# Patient Record
Sex: FEMALE | Race: WHITE | NOT HISPANIC OR LATINO | Employment: FULL TIME | URBAN - METROPOLITAN AREA
[De-identification: names, ages, dates, MRNs, and addresses within clinical notes are randomized per-mention and may not be internally consistent; named-entity substitution may affect disease eponyms.]

---

## 2017-02-02 ENCOUNTER — OFFICE VISIT (OUTPATIENT)
Dept: INTERNAL MEDICINE | Facility: CLINIC | Age: 42
End: 2017-02-02

## 2017-02-02 VITALS
HEIGHT: 70 IN | RESPIRATION RATE: 18 BRPM | WEIGHT: 180 LBS | DIASTOLIC BLOOD PRESSURE: 78 MMHG | SYSTOLIC BLOOD PRESSURE: 102 MMHG | HEART RATE: 72 BPM | BODY MASS INDEX: 25.77 KG/M2 | OXYGEN SATURATION: 99 %

## 2017-02-02 DIAGNOSIS — F41.0 PANIC ATTACK: ICD-10-CM

## 2017-02-02 DIAGNOSIS — F41.9 ANXIETY: Primary | ICD-10-CM

## 2017-02-02 PROBLEM — G43.909 MIGRAINE: Status: ACTIVE | Noted: 2017-02-02

## 2017-02-02 PROBLEM — E55.9 VITAMIN D DEFICIENCY: Status: ACTIVE | Noted: 2017-02-02

## 2017-02-02 PROCEDURE — 99213 OFFICE O/P EST LOW 20 MIN: CPT | Performed by: INTERNAL MEDICINE

## 2017-02-02 RX ORDER — DULOXETIN HYDROCHLORIDE 60 MG/1
60 CAPSULE, DELAYED RELEASE ORAL DAILY
Qty: 30 CAPSULE | Refills: 6 | Status: SHIPPED | OUTPATIENT
Start: 2017-02-02 | End: 2017-03-20

## 2017-02-02 RX ORDER — NARATRIPTAN 2.5 MG/1
TABLET ORAL
Refills: 2 | COMMUNITY
Start: 2017-01-24 | End: 2017-10-23

## 2017-02-02 RX ORDER — ALPRAZOLAM 0.25 MG/1
0.25 TABLET ORAL NIGHTLY PRN
Qty: 6 TABLET | Refills: 1 | Status: SHIPPED | OUTPATIENT
Start: 2017-02-02 | End: 2017-06-26 | Stop reason: SDUPTHER

## 2017-02-02 RX ORDER — ALPRAZOLAM 0.25 MG/1
TABLET ORAL
COMMUNITY
Start: 2015-10-01 | End: 2017-02-02 | Stop reason: SDUPTHER

## 2017-02-02 RX ORDER — DEXAMETHASONE 4 MG/1
TABLET ORAL
Refills: 1 | COMMUNITY
Start: 2016-12-13 | End: 2017-10-23

## 2017-02-02 NOTE — PROGRESS NOTES
Chief Complaint  Chhaya Wyatt is a 42 y.o. female who presents for Panic Attack (Pt feels like panic attacks are worsening. )  .    History of Present Illness   She has had a lot of nauseousness that she associates with anxiety.  She has had two panic attacks this week that she had to take her xanax for.  She is still taking lexapro.  She is also on propranolol daily for her migraines.  She always has some stressor in her life but nothing new or overwhelming right now.  She isn't exercising much.  She is wondering if the lexapro just isn't helping anymore.        Review of Systems   Psychiatric/Behavioral: Positive for dysphoric mood and sleep disturbance. Negative for agitation and self-injury. The patient is nervous/anxious.        Patient Active Problem List   Diagnosis   • Anxiety   • Migraine   • Migraine without aura and responsive to treatment   • Panic attack   • Vitamin D deficiency       No past medical history on file.    No past surgical history on file.    No family history on file.    Social History     Social History   • Marital status:      Spouse name: N/A   • Number of children: N/A   • Years of education: N/A     Occupational History   • Not on file.     Social History Main Topics   • Smoking status: Not on file   • Smokeless tobacco: Not on file   • Alcohol use Not on file   • Drug use: Not on file   • Sexual activity: Not on file     Other Topics Concern   • Not on file     Social History Narrative       Current Outpatient Prescriptions on File Prior to Visit   Medication Sig Dispense Refill   • propranolol (INDERAL) 20 MG tablet TAKE 1 TABLET TWICE DAILY. 180 tablet 0   • [DISCONTINUED] escitalopram (LEXAPRO) 20 MG tablet TAKE 1 TABLET EVERY DAY 90 tablet 2     No current facility-administered medications on file prior to visit.        Allergies   Allergen Reactions   • Bacitracin        Vitals:    02/02/17 1133   BP: 102/78   Pulse: 72   Resp: 18   SpO2: 99%   Weight: 180 lb (81.6 kg)  "  Height: 70\" (177.8 cm)       Body mass index is 25.83 kg/(m^2).    Objective   Physical Exam   Constitutional: She is oriented to person, place, and time. She appears well-developed and well-nourished. No distress.   HENT:   Head: Normocephalic and atraumatic.   Eyes: Conjunctivae are normal. No scleral icterus.   Neck: Normal range of motion. Neck supple.   Cardiovascular: Normal rate and regular rhythm.    Pulmonary/Chest: Effort normal and breath sounds normal.   Lymphadenopathy:     She has no cervical adenopathy.   Neurological: She is alert and oriented to person, place, and time. No cranial nerve deficit.   Psychiatric: She has a normal mood and affect. Her behavior is normal. Judgment and thought content normal.       Results for orders placed or performed in visit on 08/18/15   CBC and Differential   Result Value Ref Range    WBC 5.15 4.50 - 10.70 K/Cumm    RBC 5.02 3.90 - 5.20 Million    Hemoglobin 13.4 11.9 - 15.5 g/dL    Hematocrit 41.3 35.6 - 45.5 %    MCV 82.3 80.5 - 98.2 fL    MCH 26.7 (L) 26.9 - 32.0 pg    MCHC 32.4 32.4 - 36.3 g/dL    RDW 12.8 11.7 - 13.0 %    Platelets 216 140 - 500 K/Cumm    Neutrophil Rel % 63.7 42.7 - 76.0 %    Lymphocyte Rel % 23.9 19.6 - 45.3 %    Monocyte Rel % 9.9 5.0 - 12.0 %    Eosinophil Rel % 2.3 0.3 - 6.2 %    Basophil Rel % 0.2 0.0 - 1.5 %    Neutrophils Absolute 3.3 1.9 - 8.1 K/Cumm    Lymphocytes Absolute 1.2 0.9 - 4.8 K/Cumm    Monocytes Absolute 0.5 0.2 - 1.2 K/Cumm    Eosinophils Absolute 0.1 0.0 - 0.7 K/Cumm    Basophils Absolute 0.0 0.0 - 0.2 K/Cumm    Immature Granulocyte Rel % 0.0 0.0 - 0.6 %    Immature Grans Absolute 0.0 0.00 - 0.64 K/Cumm   Comprehensive metabolic panel   Result Value Ref Range    Glucose 82 65 - 99 mg/dL    BUN 12 6 - 20 mg/dL    Creatinine 0.99 0.57 - 1.00 mg/dL    eGFR Non African Am >60 mL/min/1.732    eGFR African Am >60 mL/min/1.732    BUN/Creatinine Ratio 12     Sodium 140 136 - 145 mmol/L    Potassium 3.9 3.5 - 5.2 mmol/L    " Chloride 100 98 - 107 mmol/L    Total CO2 25 22 - 29 mmol/L    Calcium 9.3 8.6 - 10.5 mg/dL    Total Protein 7.2 6.0 - 8.5 g/dL    Albumin 4.7 3.5 - 5.2 g/dL    Globulin 2.5 g/dL    A/G Ratio 1.9     Total Bilirubin 0.4 0.1 - 1.2 mg/dL    Alkaline Phosphatase 53 39 - 117 U/L    AST (SGOT) 16 5 - 32 U/L    ALT (SGPT) 10 5 - 33 U/L   Amylase   Result Value Ref Range    Amylase 81 28 - 100 U/L   Lipase   Result Value Ref Range    Lipase 34 13 - 60 U/L       Assessment/Plan   Diagnoses and all orders for this visit:    Anxiety    Panic attack    Other orders  -     dexamethasone (DECADRON) 4 MG tablet; TAKE 2-3 TABLETS BY MOUTH ONCE AS NEEDED (HEADACHE)  -     naratriptan (AMERGE) 2.5 MG tablet; TAKE 1 TABLET BY MOUTH AT ONSET OF MIGRAINE, MAY REPEAT IF MIGRAINE PERSISTS. MAX 5MG/24HR  -     Discontinue: ALPRAZolam (XANAX) 0.25 MG tablet; Take  by mouth.  -     ALPRAZolam (XANAX) 0.25 MG tablet; Take 1 tablet by mouth At Night As Needed for anxiety.  -     DULoxetine (CYMBALTA) 60 MG capsule; Take 1 capsule by mouth Daily.      Discussion/Summary  Chhaya is here fore acute care for her anxiety.  She has had worsening symptoms lately accompanied by several panic attacks.  We are going to try changing her lexapro to cymbalta.  I have strongly encouraged her to exercise.  She has a physical scheduled in July.  Certainly, we can follow up sooner if her symptoms aren't improving.  New agreement signed today for the xanax.  Refill given.    No Follow-up on file.

## 2017-03-17 ENCOUNTER — TELEPHONE (OUTPATIENT)
Dept: INTERNAL MEDICINE | Facility: CLINIC | Age: 42
End: 2017-03-17

## 2017-03-17 NOTE — TELEPHONE ENCOUNTER
Pt stated that we switched her from her lexapro to cymbalta, 6 weeks ago and that she is having dizzy spells and trouble thinking. Wants to know what to do for this.      Ward advised to see pt Monday.   Pt scheduled.

## 2017-03-20 ENCOUNTER — OFFICE VISIT (OUTPATIENT)
Dept: INTERNAL MEDICINE | Facility: CLINIC | Age: 42
End: 2017-03-20

## 2017-03-20 VITALS
DIASTOLIC BLOOD PRESSURE: 82 MMHG | OXYGEN SATURATION: 98 % | WEIGHT: 182 LBS | BODY MASS INDEX: 26.05 KG/M2 | HEART RATE: 76 BPM | SYSTOLIC BLOOD PRESSURE: 124 MMHG | HEIGHT: 70 IN | RESPIRATION RATE: 18 BRPM

## 2017-03-20 DIAGNOSIS — R42 DIZZINESS: ICD-10-CM

## 2017-03-20 DIAGNOSIS — F41.9 ANXIETY: Primary | ICD-10-CM

## 2017-03-20 PROCEDURE — 99214 OFFICE O/P EST MOD 30 MIN: CPT | Performed by: INTERNAL MEDICINE

## 2017-03-20 RX ORDER — FLUOXETINE 20 MG/1
20 TABLET, FILM COATED ORAL DAILY
Qty: 30 TABLET | Refills: 6 | Status: SHIPPED | OUTPATIENT
Start: 2017-03-20 | End: 2017-06-18

## 2017-03-20 RX ORDER — FLUOXETINE 20 MG/1
20 TABLET, FILM COATED ORAL DAILY
Qty: 30 TABLET | Refills: 2 | Status: SHIPPED | OUTPATIENT
Start: 2017-03-20 | End: 2017-03-20 | Stop reason: SDUPTHER

## 2017-03-20 NOTE — PROGRESS NOTES
Chief Complaint  Chhaya Wyatt is a 42 y.o. female who presents for Medication Reaction (Pt was switched to cymbalta from lexapro and is not tolerating the Cymbalta very well. Having dizzy spells and cannot form clear thoughts sometimes. )  .    History of Present Illness   Chhaya is here for follow up for her anxiety.  She has been on cymbalta now for six weeks.  She has felt dizzy and just not all there cognitively. She can't really tell if the anxiety is any different because these new symptoms/side effects have really thrown her for a loop.  She would like to go back to an SSRI.  She was on lexapro before the cymbalta.  She has been on fluoxetine many years ago.      Review of Systems   Constitutional: Positive for fatigue.   Neurological: Positive for dizziness. Negative for light-headedness and headaches.   Psychiatric/Behavioral: Positive for decreased concentration. Negative for agitation and confusion. The patient is nervous/anxious.        Patient Active Problem List   Diagnosis   • Anxiety   • Migraine   • Migraine without aura and responsive to treatment   • Panic attack   • Vitamin D deficiency       No past medical history on file.    No past surgical history on file.    No family history on file.    Social History     Social History   • Marital status:      Spouse name: N/A   • Number of children: N/A   • Years of education: N/A     Occupational History   • Not on file.     Social History Main Topics   • Smoking status: Not on file   • Smokeless tobacco: Not on file   • Alcohol use Not on file   • Drug use: Not on file   • Sexual activity: Not on file     Other Topics Concern   • Not on file     Social History Narrative       Current Outpatient Prescriptions on File Prior to Visit   Medication Sig Dispense Refill   • ALPRAZolam (XANAX) 0.25 MG tablet Take 1 tablet by mouth At Night As Needed for anxiety. 6 tablet 1   • dexamethasone (DECADRON) 4 MG tablet TAKE 2-3 TABLETS BY MOUTH ONCE AS NEEDED  "(HEADACHE)  1   • naratriptan (AMERGE) 2.5 MG tablet TAKE 1 TABLET BY MOUTH AT ONSET OF MIGRAINE, MAY REPEAT IF MIGRAINE PERSISTS. MAX 5MG/24HR  2   • propranolol (INDERAL) 20 MG tablet TAKE 1 TABLET TWICE DAILY. 180 tablet 0   • [DISCONTINUED] DULoxetine (CYMBALTA) 60 MG capsule Take 1 capsule by mouth Daily. 30 capsule 6     No current facility-administered medications on file prior to visit.        Allergies   Allergen Reactions   • Bacitracin        Vitals:    03/20/17 1144   BP: 124/82   Pulse: 76   Resp: 18   SpO2: 98%   Weight: 182 lb (82.6 kg)   Height: 70\" (177.8 cm)       Body mass index is 26.11 kg/(m^2).    Objective   Physical Exam   Constitutional: She is oriented to person, place, and time. She appears well-developed and well-nourished. No distress.   HENT:   Head: Normocephalic and atraumatic.   Eyes: Conjunctivae are normal. No scleral icterus.   Pulmonary/Chest: Effort normal. No respiratory distress.   Neurological: She is alert and oriented to person, place, and time. No cranial nerve deficit.   Psychiatric: She has a normal mood and affect. Her behavior is normal. Judgment and thought content normal. Her speech is not rapid and/or pressured and not slurred. Cognition and memory are normal.         Assessment/Plan   Diagnoses and all orders for this visit:    Anxiety  -     Discontinue: FLUoxetine (PROzac) 20 MG tablet; Take 1 tablet by mouth Daily for 90 days.  -     FLUoxetine (PROzac) 20 MG tablet; Take 1 tablet by mouth Daily for 90 days.    Dizziness  -     Hemoglobin A1c  -     Comprehensive Metabolic Panel  -     CBC & Differential        Discussion/Summary  Chhaya is here to follow up on her anxiety.  She is not tolerating the cymbalta well.  She is having a lot of dizziness and mental fogginess.  We are going to put her back on an SSRI but try fluoxetine instead of lexapro.  Will check basic labs today since it has been two years since she had any labs drawn.    No Follow-up on " file.

## 2017-03-21 ENCOUNTER — TELEPHONE (OUTPATIENT)
Dept: INTERNAL MEDICINE | Facility: CLINIC | Age: 42
End: 2017-03-21

## 2017-03-21 LAB
ALBUMIN SERPL-MCNC: 4.8 G/DL (ref 3.5–5.2)
ALBUMIN/GLOB SERPL: 1.8 G/DL
ALP SERPL-CCNC: 60 U/L (ref 39–117)
ALT SERPL-CCNC: 33 U/L (ref 1–33)
AST SERPL-CCNC: 28 U/L (ref 1–32)
BASOPHILS # BLD AUTO: 0.02 10*3/MM3 (ref 0–0.2)
BASOPHILS NFR BLD AUTO: 0.3 % (ref 0–1.5)
BILIRUB SERPL-MCNC: 0.3 MG/DL (ref 0.1–1.2)
BUN SERPL-MCNC: 15 MG/DL (ref 6–20)
BUN/CREAT SERPL: 18.3 (ref 7–25)
CALCIUM SERPL-MCNC: 9.3 MG/DL (ref 8.6–10.5)
CHLORIDE SERPL-SCNC: 102 MMOL/L (ref 98–107)
CO2 SERPL-SCNC: 24.5 MMOL/L (ref 22–29)
CREAT SERPL-MCNC: 0.82 MG/DL (ref 0.57–1)
EOSINOPHIL # BLD AUTO: 0.15 10*3/MM3 (ref 0–0.7)
EOSINOPHIL NFR BLD AUTO: 2.6 % (ref 0.3–6.2)
ERYTHROCYTE [DISTWIDTH] IN BLOOD BY AUTOMATED COUNT: 13.5 % (ref 11.7–13)
GLOBULIN SER CALC-MCNC: 2.6 GM/DL
GLUCOSE SERPL-MCNC: 77 MG/DL (ref 65–99)
HBA1C MFR BLD: 5.23 % (ref 4.8–5.6)
HCT VFR BLD AUTO: 40.2 % (ref 35.6–45.5)
HGB BLD-MCNC: 12.7 G/DL (ref 11.9–15.5)
IMM GRANULOCYTES # BLD: 0 10*3/MM3 (ref 0–0.03)
IMM GRANULOCYTES NFR BLD: 0 % (ref 0–0.5)
LYMPHOCYTES # BLD AUTO: 1.62 10*3/MM3 (ref 0.9–4.8)
LYMPHOCYTES NFR BLD AUTO: 27.9 % (ref 19.6–45.3)
MCH RBC QN AUTO: 27.1 PG (ref 26.9–32)
MCHC RBC AUTO-ENTMCNC: 31.6 G/DL (ref 32.4–36.3)
MCV RBC AUTO: 85.7 FL (ref 80.5–98.2)
MONOCYTES # BLD AUTO: 0.54 10*3/MM3 (ref 0.2–1.2)
MONOCYTES NFR BLD AUTO: 9.3 % (ref 5–12)
NEUTROPHILS # BLD AUTO: 3.48 10*3/MM3 (ref 1.9–8.1)
NEUTROPHILS NFR BLD AUTO: 59.9 % (ref 42.7–76)
PLATELET # BLD AUTO: 243 10*3/MM3 (ref 140–500)
POTASSIUM SERPL-SCNC: 4.4 MMOL/L (ref 3.5–5.2)
PROT SERPL-MCNC: 7.4 G/DL (ref 6–8.5)
RBC # BLD AUTO: 4.69 10*6/MM3 (ref 3.9–5.2)
SODIUM SERPL-SCNC: 141 MMOL/L (ref 136–145)
WBC # BLD AUTO: 5.81 10*3/MM3 (ref 4.5–10.7)

## 2017-03-21 NOTE — TELEPHONE ENCOUNTER
----- Message from Nia Hopper MD sent at 3/21/2017  4:02 PM EDT -----  Please call - her A1c is normal.  Her kidney and liver function and electrolytes and blood counts are all ok.

## 2017-06-26 RX ORDER — ALPRAZOLAM 0.25 MG/1
TABLET ORAL
Qty: 6 TABLET | Refills: 2 | OUTPATIENT
Start: 2017-06-26

## 2017-07-18 DIAGNOSIS — Z00.00 HEALTH CARE MAINTENANCE: Primary | ICD-10-CM

## 2017-07-18 DIAGNOSIS — E55.9 VITAMIN D DEFICIENCY: ICD-10-CM

## 2017-07-22 LAB
25(OH)D3+25(OH)D2 SERPL-MCNC: 24.1 NG/ML (ref 30–100)
ALBUMIN SERPL-MCNC: 4.3 G/DL (ref 3.5–5.5)
ALBUMIN/GLOB SERPL: 1.8 {RATIO} (ref 1.2–2.2)
ALP SERPL-CCNC: 62 IU/L (ref 39–117)
ALT SERPL-CCNC: 12 IU/L (ref 0–32)
APPEARANCE UR: CLEAR
AST SERPL-CCNC: 17 IU/L (ref 0–40)
BACTERIA #/AREA URNS HPF: NORMAL /[HPF]
BASOPHILS # BLD AUTO: 0 X10E3/UL (ref 0–0.2)
BASOPHILS NFR BLD AUTO: 0 %
BILIRUB SERPL-MCNC: 0.3 MG/DL (ref 0–1.2)
BILIRUB UR QL STRIP: NEGATIVE
BUN SERPL-MCNC: 12 MG/DL (ref 6–24)
BUN/CREAT SERPL: 14 (ref 9–23)
CALCIUM SERPL-MCNC: 9.1 MG/DL (ref 8.7–10.2)
CHLORIDE SERPL-SCNC: 100 MMOL/L (ref 96–106)
CHOLEST SERPL-MCNC: 190 MG/DL (ref 100–199)
CO2 SERPL-SCNC: 22 MMOL/L (ref 18–29)
COLOR UR: YELLOW
CREAT SERPL-MCNC: 0.88 MG/DL (ref 0.57–1)
EOSINOPHIL # BLD AUTO: 0.1 X10E3/UL (ref 0–0.4)
EOSINOPHIL NFR BLD AUTO: 2 %
EPI CELLS #/AREA URNS HPF: NORMAL /HPF
ERYTHROCYTE [DISTWIDTH] IN BLOOD BY AUTOMATED COUNT: 13.4 % (ref 12.3–15.4)
GLOBULIN SER CALC-MCNC: 2.4 G/DL (ref 1.5–4.5)
GLUCOSE SERPL-MCNC: 91 MG/DL (ref 65–99)
GLUCOSE UR QL: NEGATIVE
HBA1C MFR BLD: 5.7 % (ref 4.8–5.6)
HCT VFR BLD AUTO: 39.3 % (ref 34–46.6)
HDLC SERPL-MCNC: 65 MG/DL
HGB BLD-MCNC: 12.7 G/DL (ref 11.1–15.9)
HGB UR QL STRIP: NEGATIVE
IMM GRANULOCYTES # BLD: 0 X10E3/UL (ref 0–0.1)
IMM GRANULOCYTES NFR BLD: 0 %
KETONES UR QL STRIP: NEGATIVE
LDLC SERPL CALC-MCNC: 113 MG/DL (ref 0–99)
LEUKOCYTE ESTERASE UR QL STRIP: NEGATIVE
LYMPHOCYTES # BLD AUTO: 1.2 X10E3/UL (ref 0.7–3.1)
LYMPHOCYTES NFR BLD AUTO: 20 %
MCH RBC QN AUTO: 26.6 PG (ref 26.6–33)
MCHC RBC AUTO-ENTMCNC: 32.3 G/DL (ref 31.5–35.7)
MCV RBC AUTO: 82 FL (ref 79–97)
MICRO URNS: NORMAL
MICRO URNS: NORMAL
MONOCYTES # BLD AUTO: 0.5 X10E3/UL (ref 0.1–0.9)
MONOCYTES NFR BLD AUTO: 9 %
NEUTROPHILS # BLD AUTO: 4.1 X10E3/UL (ref 1.4–7)
NEUTROPHILS NFR BLD AUTO: 69 %
NITRITE UR QL STRIP: NEGATIVE
PH UR STRIP: 6 [PH] (ref 5–7.5)
PLATELET # BLD AUTO: 245 X10E3/UL (ref 150–379)
POTASSIUM SERPL-SCNC: 4.4 MMOL/L (ref 3.5–5.2)
PROT SERPL-MCNC: 6.7 G/DL (ref 6–8.5)
PROT UR QL STRIP: NEGATIVE
RBC # BLD AUTO: 4.78 X10E6/UL (ref 3.77–5.28)
RBC #/AREA URNS HPF: NORMAL /HPF
SODIUM SERPL-SCNC: 140 MMOL/L (ref 134–144)
SP GR UR: 1.01 (ref 1–1.03)
TRIGL SERPL-MCNC: 59 MG/DL (ref 0–149)
TSH SERPL DL<=0.005 MIU/L-ACNC: 1.11 UIU/ML (ref 0.45–4.5)
URINALYSIS REFLEX: NORMAL
UROBILINOGEN UR STRIP-MCNC: 0.2 MG/DL (ref 0.2–1)
VLDLC SERPL CALC-MCNC: 12 MG/DL (ref 5–40)
WBC # BLD AUTO: 6 X10E3/UL (ref 3.4–10.8)
WBC #/AREA URNS HPF: NORMAL /HPF

## 2017-07-27 ENCOUNTER — OFFICE VISIT (OUTPATIENT)
Dept: INTERNAL MEDICINE | Facility: CLINIC | Age: 42
End: 2017-07-27

## 2017-07-27 VITALS
DIASTOLIC BLOOD PRESSURE: 78 MMHG | OXYGEN SATURATION: 100 % | HEART RATE: 85 BPM | HEIGHT: 70 IN | BODY MASS INDEX: 25.05 KG/M2 | WEIGHT: 175 LBS | SYSTOLIC BLOOD PRESSURE: 118 MMHG | RESPIRATION RATE: 18 BRPM

## 2017-07-27 DIAGNOSIS — F41.9 ANXIETY: ICD-10-CM

## 2017-07-27 DIAGNOSIS — E55.9 VITAMIN D DEFICIENCY: ICD-10-CM

## 2017-07-27 DIAGNOSIS — Z00.00 HEALTH MAINTENANCE EXAMINATION: Primary | ICD-10-CM

## 2017-07-27 PROBLEM — R73.03 PREDIABETES: Status: ACTIVE | Noted: 2017-07-27

## 2017-07-27 PROCEDURE — 99396 PREV VISIT EST AGE 40-64: CPT | Performed by: INTERNAL MEDICINE

## 2017-07-27 RX ORDER — FLUOXETINE HYDROCHLORIDE 20 MG/1
20 CAPSULE ORAL DAILY
COMMUNITY
End: 2017-10-24 | Stop reason: SDUPTHER

## 2017-07-27 NOTE — PROGRESS NOTES
Chief Complaint  Chhaya Wyatt is a 42 y.o. female who presents for Annual Exam (CPE)  .    History of Present Illness   Chhaya is here for routine CPE.  No new issues.    Mammo: she has one scheduled in Oct  Pap: October    Exercise: moderate    Immunization History   Administered Date(s) Administered   • Tdap 01/01/2008       Review of Systems   Constitution: Negative for chills, fever and malaise/fatigue.   HENT: Positive for headaches (migraines - sees neurologist). Negative for hearing loss, hoarse voice and sore throat.    Eyes: Negative for blurred vision, double vision and visual disturbance.   Cardiovascular: Negative for chest pain, leg swelling and palpitations.   Respiratory: Negative for cough and shortness of breath.    Endocrine: Negative for polydipsia and polyuria.   Hematologic/Lymphatic: Bruises/bleeds easily.   Skin: Negative for rash and suspicious lesions.   Musculoskeletal: Negative for arthritis and myalgias.   Gastrointestinal: Negative for bloating, abdominal pain, change in bowel habit, constipation, diarrhea, dysphagia, hematochezia, melena, nausea and vomiting.   Genitourinary: Negative for dysuria and hematuria.   Neurological: Negative for dizziness and light-headedness.   Psychiatric/Behavioral: Negative for depression. The patient is nervous/anxious.        Patient Active Problem List   Diagnosis   • Anxiety   • Migraine   • Migraine without aura and responsive to treatment   • Panic attack   • Vitamin D deficiency   • Prediabetes       No past medical history on file.    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[employer]      Social History Main Topics   • Smoking status: Never Smoker   • Smokeless tobacco: Never Used   • Alcohol use No   • Drug use: No   • Sexual activity: Not on file     Other Topics Concern   • Not on file     Social History Narrative   • No narrative on file       Current Outpatient Prescriptions on File Prior to Visit   Medication Sig Dispense Refill   • ALPRAZolam (XANAX) 0.25 MG tablet TAKE ONE TABLET BY MOUTH EVERY 8 HOURS AS NEEDED 6 tablet 2   • dexamethasone (DECADRON) 4 MG tablet TAKE 2-3 TABLETS BY MOUTH ONCE AS NEEDED (HEADACHE)  1   • naratriptan (AMERGE) 2.5 MG tablet TAKE 1 TABLET BY MOUTH AT ONSET OF MIGRAINE, MAY REPEAT IF MIGRAINE PERSISTS. MAX 5MG/24HR  2   • propranolol (INDERAL) 20 MG tablet TAKE 1 TABLET TWICE DAILY. 180 tablet 0     No current facility-administered medications on file prior to visit.        Allergies   Allergen Reactions   • Bacitracin        Vitals:    07/27/17 1310   BP: 118/78   Pulse: 85   Resp: 18   SpO2: 100%   Weight: 175 lb (79.4 kg)   Height: 70\" (177.8 cm)       Body mass index is 25.11 kg/(m^2).    Objective   Physical Exam   Constitutional: She is oriented to person, place, and time. She appears well-developed and well-nourished. No distress.   HENT:   Head: Normocephalic and atraumatic.   Nose: Nose normal.   Mouth/Throat: Oropharynx is clear and moist.   Eyes: Conjunctivae and EOM are normal. Pupils are equal, round, and reactive to light. No scleral icterus.   Neck: Normal range of motion. Neck supple. No thyromegaly present.   Cardiovascular: Normal rate, regular rhythm and normal heart sounds.  Exam reveals no gallop and no friction rub.    No murmur heard.  Pulses:       Carotid pulses are 2+ on the right side, and 2+ on the left side.       Femoral pulses are 2+ on the right side, and 2+ on the left side.   "     Dorsalis pedis pulses are 2+ on the right side, and 2+ on the left side.        Posterior tibial pulses are 2+ on the right side, and 2+ on the left side.   Pulmonary/Chest: Effort normal and breath sounds normal. No respiratory distress. She has no wheezes. She has no rales. Right breast exhibits no mass and no nipple discharge. Left breast exhibits no mass and no nipple discharge.   Abdominal: Soft. Bowel sounds are normal. She exhibits no distension and no mass. There is no tenderness.   Musculoskeletal: Normal range of motion. She exhibits no edema.       Vascular Status -  Her exam exhibits no right foot edema. Her exam exhibits no left foot edema.   Skin Integrity  -  Her right foot skin is intact.     Chhaya 's left foot skin is intact. .  Lymphadenopathy:     She has no cervical adenopathy.     She has no axillary adenopathy.        Right: No inguinal and no supraclavicular adenopathy present.        Left: No inguinal and no supraclavicular adenopathy present.   Neurological: She is alert and oriented to person, place, and time. She has normal reflexes. No cranial nerve deficit.   Skin: Skin is warm and dry.   Psychiatric: She has a normal mood and affect. Her speech is normal and behavior is normal. Judgment and thought content normal. Cognition and memory are normal.   Vitals reviewed.        Results for orders placed or performed in visit on 07/18/17   Comprehensive Metabolic Panel   Result Value Ref Range    Glucose 91 65 - 99 mg/dL    BUN 12 6 - 24 mg/dL    Creatinine 0.88 0.57 - 1.00 mg/dL    eGFR Non African Am 82 >59 mL/min/1.73    eGFR African Am 94 >59 mL/min/1.73    BUN/Creatinine Ratio 14 9 - 23    Sodium 140 134 - 144 mmol/L    Potassium 4.4 3.5 - 5.2 mmol/L    Chloride 100 96 - 106 mmol/L    Total CO2 22 18 - 29 mmol/L    Calcium 9.1 8.7 - 10.2 mg/dL    Total Protein 6.7 6.0 - 8.5 g/dL    Albumin 4.3 3.5 - 5.5 g/dL    Globulin 2.4 1.5 - 4.5 g/dL    A/G Ratio 1.8 1.2 - 2.2    Total Bilirubin  0.3 0.0 - 1.2 mg/dL    Alkaline Phosphatase 62 39 - 117 IU/L    AST (SGOT) 17 0 - 40 IU/L    ALT (SGPT) 12 0 - 32 IU/L   Lipid Panel   Result Value Ref Range    Total Cholesterol 190 100 - 199 mg/dL    Triglycerides 59 0 - 149 mg/dL    HDL Cholesterol 65 >39 mg/dL    VLDL Cholesterol 12 5 - 40 mg/dL    LDL Cholesterol  113 (H) 0 - 99 mg/dL   TSH Rfx On Abnormal To Free T4   Result Value Ref Range    TSH 1.110 0.450 - 4.500 uIU/mL   UA / M With / Rflx Culture([facility] ONLY)   Result Value Ref Range    Specific Gravity, UA 1.007 1.005 - 1.030    pH, UA 6.0 5.0 - 7.5    Color, UA Yellow Yellow    Appearance, UA Clear Clear    Leukocytes, UA Negative Negative    Protein Negative Negative/Trace    Glucose, UA Negative Negative    Ketones Negative Negative    Blood, UA Negative Negative    Bilirubin, UA Negative Negative    Urobilinogen, UA 0.2 0.2 - 1.0 mg/dL    Nitrite, UA Negative Negative    Microscopic Examination Comment     MICROSCOPIC EXAMINATION See below:     Urinalysis Reflex Comment    Vitamin D 25 Hydroxy   Result Value Ref Range    25 Hydroxy, Vitamin D 24.1 (L) 30.0 - 100.0 ng/mL   Hemoglobin A1c   Result Value Ref Range    Hemoglobin A1C 5.7 (H) 4.8 - 5.6 %   Microscopic Examination   Result Value Ref Range    WBC, UA 0-5 0 - 5 /hpf    RBC, UA None seen 0 - 2 /hpf    Epithelial Cells (non renal) 0-10 0 - 10 /hpf    Bacteria, UA Few None seen/Few   CBC & Differential   Result Value Ref Range    WBC 6.0 3.4 - 10.8 x10E3/uL    RBC 4.78 3.77 - 5.28 x10E6/uL    Hemoglobin 12.7 11.1 - 15.9 g/dL    Hematocrit 39.3 34.0 - 46.6 %    MCV 82 79 - 97 fL    MCH 26.6 26.6 - 33.0 pg    MCHC 32.3 31.5 - 35.7 g/dL    RDW 13.4 12.3 - 15.4 %    Platelets 245 150 - 379 x10E3/uL    Neutrophil Rel % 69 %    Lymphocyte Rel % 20 %    Monocyte Rel % 9 %    Eosinophil Rel % 2 %    Basophil Rel % 0 %    Neutrophils Absolute 4.1 1.4 - 7.0 x10E3/uL    Lymphocytes Absolute 1.2 0.7 - 3.1 x10E3/uL    Monocytes Absolute 0.5 0.1 - 0.9  x10E3/uL    Eosinophils Absolute 0.1 0.0 - 0.4 x10E3/uL    Basophils Absolute 0.0 0.0 - 0.2 x10E3/uL    Immature Granulocyte Rel % 0 %    Immature Grans Absolute 0.0 0.0 - 0.1 x10E3/uL       Assessment/Plan   Diagnoses and all orders for this visit:    Health maintenance examination    Anxiety    Vitamin D deficiency    Other orders  -     FLUoxetine (PROzac) 20 MG capsule; Take 20 mg by mouth Daily.        Discussion/Summary  Sharath is here for routine CPE.  She is doing very well overall.  Continue current meds.  She has a mammo scheduled in October.  Continue to stay active.        Return in about 1 year (around 7/27/2018) for Annual physical, with fasting labs prior.

## 2017-10-23 ENCOUNTER — OFFICE VISIT (OUTPATIENT)
Dept: INTERNAL MEDICINE | Facility: CLINIC | Age: 42
End: 2017-10-23

## 2017-10-23 VITALS
WEIGHT: 176 LBS | HEART RATE: 73 BPM | DIASTOLIC BLOOD PRESSURE: 74 MMHG | OXYGEN SATURATION: 100 % | RESPIRATION RATE: 18 BRPM | HEIGHT: 70 IN | BODY MASS INDEX: 25.2 KG/M2 | SYSTOLIC BLOOD PRESSURE: 110 MMHG

## 2017-10-23 DIAGNOSIS — R07.81 PLEURITIC CHEST PAIN: ICD-10-CM

## 2017-10-23 DIAGNOSIS — R07.9 RIGHT-SIDED CHEST PAIN: Primary | ICD-10-CM

## 2017-10-23 LAB
HCT VFR BLDA CALC: 39.4 %
HGB BLDA-MCNC: 12.6 G/DL
LYMPHOCYTES # BLD: 28.4 %
MCH, POC: 26.4
MCHC, POC: 32.1
MCV, POC: 82.5
MONOCYTES # BLD: 7.5 %
PLATELET # BLD: 259 10*3/MM3
PMV BLD: 6.8 FL
POC NEUTROPHIL: 64.1 %
RBC, POC: 4.78
RDW, POC: 13.4
WBC # BLD: 4.7 10*3/UL

## 2017-10-23 PROCEDURE — 99214 OFFICE O/P EST MOD 30 MIN: CPT | Performed by: INTERNAL MEDICINE

## 2017-10-23 PROCEDURE — 85025 COMPLETE CBC W/AUTO DIFF WBC: CPT | Performed by: INTERNAL MEDICINE

## 2017-10-23 PROCEDURE — 71020 XR CHEST PA AND LATERAL: CPT | Performed by: INTERNAL MEDICINE

## 2017-10-23 RX ORDER — PROMETHAZINE HYDROCHLORIDE 25 MG/1
25 TABLET ORAL
COMMUNITY
Start: 2017-09-22 | End: 2018-05-21

## 2017-10-23 NOTE — PROGRESS NOTES
Chief Complaint  Chhaya Wyatt is a 42 y.o. female who presents for Chest Pain (Rib pain, not chest pain. s2kdehx ago. Right sided and wraps around to her back. It hurts when she breathes, clenching pain. Not stabbing. )  .    History of Present Illness   Chhaya is here for acute care for a new issues. She went to [facility] yesterday and the doctor there refused to see her because she could have a PE.   Three weeks ago she had a bad cough.  She developed rib/chest pain with the cough.  She hadn't been coughing in two weeks.  The rib pain is still there.  Yesterday it got worse.  She denies any SOA or DAS.  No fever or chills.  No recent long car rides or plane rides.  No leg swelling.      Review of Systems   Constitution: Negative for chills, fever and night sweats.   Cardiovascular: Negative for chest pain, dyspnea on exertion, leg swelling and palpitations.   Respiratory: Negative for cough, hemoptysis, shortness of breath, sputum production and wheezing.        Patient Active Problem List   Diagnosis   • Anxiety   • Migraine   • Migraine without aura and responsive to treatment   • Panic attack   • Vitamin D deficiency   • Prediabetes       Past Medical History:   Diagnosis Date   • Depression    • Migraine        Past Surgical History:   Procedure Laterality Date   • CHOLECYSTECTOMY     • ENDOMETRIAL ABLATION     • RHINOPLASTY     • TONSILLECTOMY         Family History   Problem Relation Age of Onset   • Basal cell carcinoma Mother    • Basal cell carcinoma Father    • Breast cancer Maternal Grandmother    • Colon cancer Maternal Grandmother    • Breast cancer Paternal Grandmother        Social History     Social History   • Marital status:      Spouse name: N/A   • Number of children: N/A   • Years of education: N/A     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     • propranolol (INDERAL) 20 MG tablet TAKE 1 TABLET TWICE DAILY. 180 tablet 0   • [DISCONTINUED] amoxicillin-clavulanate (AUGMENTIN) 875-125 MG per tablet TAKE 1 TABLET BY MOUTH EVERY 12 HOURS FOR 10 DAYS  0   • [DISCONTINUED] dexamethasone (DECADRON) 4 MG tablet TAKE 2-3 TABLETS BY MOUTH ONCE AS NEEDED (HEADACHE)  1   • [DISCONTINUED] naratriptan (AMERGE) 2.5 MG tablet TAKE 1 TABLET BY MOUTH AT ONSET OF MIGRAINE, MAY REPEAT IF MIGRAINE PERSISTS. MAX 5MG/24HR  2   • [DISCONTINUED] MethylPREDNISolone (MEDROL, CELIA,) 4 MG tablet AS DIRECTED AS DIRECTED ORALLY 6 DAYS  0     No current facility-administered medications on file prior to visit.        Allergies   Allergen Reactions   • Bacitracin Rash       Vitals:    10/23/17 1250   BP: 110/74   Pulse: 73   Resp: 18   SpO2: 100%   Weight: 176 lb (79.8 kg)   Height: 70\" (177.8 cm)       Body mass index is 25.25 kg/(m^2).    Objective   Physical Exam   Constitutional: She is oriented to person, place, and time. She appears well-developed and well-nourished. No distress.   HENT:   Head: Normocephalic and atraumatic.   Eyes: Conjunctivae are normal. No scleral icterus.   Neck: Normal range of motion. Neck supple.   Cardiovascular: Normal rate, regular rhythm and normal heart sounds.  Exam reveals no gallop and no friction rub.    No murmur heard.  Pulmonary/Chest: Effort normal and breath sounds normal. No respiratory distress. She has no wheezes. She has no rales.   Musculoskeletal: She exhibits no edema.   Lymphadenopathy:     She has no cervical adenopathy.   Neurological: She is alert " and oriented to person, place, and time. No cranial nerve deficit.   Psychiatric: She has a normal mood and affect. Her behavior is normal. Judgment and thought content normal.       Results for orders placed or performed in visit on 10/23/17   POC CBC With / Auto Diff   Result Value Ref Range    WBC 4.7     RBC 4.78     Hemoglobin 12.6 g/dL    Hematocrit 39.4 %    MCV 82.5     MCH 26.4     MCHC 32.1     RDW-CV 13.4     MPV 6.8     Platelets 259 10*3/mm3    Neutrophil Rel % 64.1 %    Monocyte Rel % 7.5 %    Lymphocyte Rel % 28.4 %     CXR PA & Lat  Reason for exam: right sided pleuritic cp  Film for comparison: none  Results: NAD    Assessment/Plan   Diagnoses and all orders for this visit:    Right-sided chest pain  -     XR Chest PA & Lateral  -     POC CBC With / Auto Diff    Pleuritic chest pain  -     XR Chest PA & Lateral  -     POC CBC With / Auto Diff    Other orders  -     promethazine (PHENERGAN) 25 MG tablet; Take 25 mg by mouth.  -     SUMAtriptan Succinate 3 MG/0.5ML solution auto-injector; Inject 3 mg under the skin.  -     Mirabegron ER (MYRBETRIQ) 25 MG tablet sustained-release 24 hour 24 hr tablet; Take 25 mg by mouth.        Discussion/Summary  Chhaya is here for acute care.  She has pleuritic chest pain that sounds to be related to her viral URI she had when it started.  She is not tachycardic.  She has not risk factor for DVT.  Advised that if her symptoms worsen or she gets soa to let me know.    No Follow-up on file.

## 2017-10-24 RX ORDER — FLUOXETINE HYDROCHLORIDE 20 MG/1
CAPSULE ORAL
Qty: 90 CAPSULE | Refills: 3 | Status: SHIPPED | OUTPATIENT
Start: 2017-10-24 | End: 2018-01-30 | Stop reason: DRUGHIGH

## 2018-01-18 ENCOUNTER — OFFICE VISIT (OUTPATIENT)
Dept: RETAIL CLINIC | Facility: CLINIC | Age: 43
End: 2018-01-18

## 2018-01-18 VITALS
TEMPERATURE: 98 F | RESPIRATION RATE: 18 BRPM | DIASTOLIC BLOOD PRESSURE: 80 MMHG | HEART RATE: 82 BPM | SYSTOLIC BLOOD PRESSURE: 120 MMHG | OXYGEN SATURATION: 98 %

## 2018-01-18 DIAGNOSIS — N39.0 URINARY TRACT INFECTION WITHOUT HEMATURIA, SITE UNSPECIFIED: Primary | ICD-10-CM

## 2018-01-18 PROBLEM — R35.0 URINE FREQUENCY: Status: ACTIVE | Noted: 2018-01-18

## 2018-01-18 LAB
BILIRUB BLD-MCNC: NEGATIVE MG/DL
CLARITY, POC: ABNORMAL
COLOR UR: ABNORMAL
GLUCOSE UR STRIP-MCNC: NEGATIVE MG/DL
KETONES UR QL: NEGATIVE
LEUKOCYTE EST, POC: ABNORMAL
NITRITE UR-MCNC: NEGATIVE MG/ML
PH UR: 7.5 [PH] (ref 5–8)
PROT UR STRIP-MCNC: ABNORMAL MG/DL
RBC # UR STRIP: ABNORMAL /UL
SP GR UR: 1.01 (ref 1–1.03)
UROBILINOGEN UR QL: ABNORMAL

## 2018-01-18 PROCEDURE — 81003 URINALYSIS AUTO W/O SCOPE: CPT | Performed by: NURSE PRACTITIONER

## 2018-01-18 PROCEDURE — 99213 OFFICE O/P EST LOW 20 MIN: CPT | Performed by: NURSE PRACTITIONER

## 2018-01-18 RX ORDER — NITROFURANTOIN 25; 75 MG/1; MG/1
100 CAPSULE ORAL EVERY 12 HOURS SCHEDULED
Qty: 10 CAPSULE | Refills: 0 | Status: SHIPPED | OUTPATIENT
Start: 2018-01-18 | End: 2018-01-30

## 2018-01-18 RX ORDER — PHENAZOPYRIDINE HYDROCHLORIDE 100 MG/1
100 TABLET, FILM COATED ORAL 3 TIMES DAILY PRN
Qty: 6 TABLET | Refills: 0 | Status: SHIPPED | OUTPATIENT
Start: 2018-01-18 | End: 2018-01-30

## 2018-01-18 NOTE — PATIENT INSTRUCTIONS
Urinary Tract Infection, Adult  Introduction  A urinary tract infection (UTI) is an infection of any part of the urinary tract. The urinary tract includes the:  · Kidneys.  · Ureters.  · Bladder.  · Urethra.  These organs make, store, and get rid of pee (urine) in the body.  Follow these instructions at home:  · Take over-the-counter and prescription medicines only as told by your doctor.  · If you were prescribed an antibiotic medicine, take it as told by your doctor. Do not stop taking the antibiotic even if you start to feel better.  · Avoid the following drinks:  ¨ Alcohol.  ¨ Caffeine.  ¨ Tea.  ¨ Carbonated drinks.  · Drink enough fluid to keep your pee clear or pale yellow.  · Keep all follow-up visits as told by your doctor. This is important.  · Make sure to:  ¨ Empty your bladder often and completely. Do not to hold pee for long periods of time.  ¨ Empty your bladder before and after sex.  ¨ Wipe from front to back after a bowel movement if you are female. Use each tissue one time when you wipe.  Contact a doctor if:  · You have back pain.  · You have a fever.  · You feel sick to your stomach (nauseous).  · You throw up (vomit).  · Your symptoms do not get better after 3 days.  · Your symptoms go away and then come back.  Get help right away if:  · You have very bad back pain.  · You have very bad lower belly (abdominal) pain.  · You are throwing up and cannot keep down any medicines or water.  This information is not intended to replace advice given to you by your health care provider. Make sure you discuss any questions you have with your health care provider.  Document Released: 06/05/2009 Document Revised: 05/25/2017 Document Reviewed: 11/07/2016  © 2017 [employer]  Talked to the patient about the diagnosis and the urine findings educate the patient and advise to visit to PCP if the symptoms worsens

## 2018-01-18 NOTE — PROGRESS NOTES
Subjective   Chhaya Wyatt is a 43 y.o. female.     Urinary Frequency    This is a new problem. The current episode started yesterday. The problem has been gradually worsening. The quality of the pain is described as burning. The pain is at a severity of 4/10. The pain is moderate. There has been no fever. She is sexually active. There is no history of pyelonephritis. Associated symptoms include frequency, hesitancy and urgency. Pertinent negatives include no chills.   Difficulty Urinating   This is a new problem. The current episode started yesterday. The problem has been gradually worsening. Associated symptoms include urinary symptoms. Pertinent negatives include no chills. The symptoms are aggravated by drinking. She has tried drinking for the symptoms. The treatment provided mild relief.        The following portions of the patient's history were reviewed and updated as appropriate: allergies, current medications, past family history, past medical history, past social history, past surgical history and problem list.    Review of Systems   Constitutional: Negative for chills.   HENT: Negative.    Eyes: Negative.    Respiratory: Negative.    Cardiovascular: Negative.    Endocrine: Negative.    Genitourinary: Positive for difficulty urinating, frequency, hesitancy and urgency.       Objective   Physical Exam   Constitutional: She appears well-developed and well-nourished.   HENT:   Head: Normocephalic and atraumatic.   Eyes: Pupils are equal, round, and reactive to light.   Neck: Normal range of motion.   Cardiovascular: Regular rhythm and normal heart sounds.    Pulmonary/Chest: Effort normal and breath sounds normal.   Abdominal: Soft. Bowel sounds are normal. There is generalized tenderness and tenderness in the suprapubic area. There is no rigidity, no rebound, no guarding and no CVA tenderness.   Nursing note and vitals reviewed.      Assessment/Plan   Chhaya was seen today for urinary frequency and difficulty  urinating.    Diagnoses and all orders for this visit:    Urinary tract infection without hematuria, site unspecified  -     nitrofurantoin, macrocrystal-monohydrate, (MACROBID) 100 MG capsule; Take 1 capsule by mouth Every 12 (Twelve) Hours.  -     phenazopyridine (PYRIDIUM) 100 MG tablet; Take 1 tablet by mouth 3 (Three) Times a Day As Needed for bladder spasms.  -     POCT urinalysis dipstick, automated      Talked to the patient about the diagnosis and positive urine findings educate the patient and advise to visit to PCP if the symptoms worsens

## 2018-01-30 ENCOUNTER — OFFICE VISIT (OUTPATIENT)
Dept: INTERNAL MEDICINE | Facility: CLINIC | Age: 43
End: 2018-01-30

## 2018-01-30 VITALS
OXYGEN SATURATION: 98 % | SYSTOLIC BLOOD PRESSURE: 112 MMHG | HEART RATE: 67 BPM | WEIGHT: 175 LBS | BODY MASS INDEX: 25.05 KG/M2 | RESPIRATION RATE: 18 BRPM | HEIGHT: 70 IN | DIASTOLIC BLOOD PRESSURE: 80 MMHG

## 2018-01-30 DIAGNOSIS — R53.83 FATIGUE, UNSPECIFIED TYPE: Primary | ICD-10-CM

## 2018-01-30 DIAGNOSIS — R73.03 PREDIABETES: ICD-10-CM

## 2018-01-30 DIAGNOSIS — R11.0 NAUSEA WITHOUT VOMITING: ICD-10-CM

## 2018-01-30 PROCEDURE — 99214 OFFICE O/P EST MOD 30 MIN: CPT | Performed by: INTERNAL MEDICINE

## 2018-01-30 RX ORDER — FLUOXETINE HYDROCHLORIDE 40 MG/1
CAPSULE ORAL
Refills: 0 | COMMUNITY
Start: 2018-01-08 | End: 2018-05-21

## 2018-01-30 RX ORDER — PROPRANOLOL HCL 60 MG
CAPSULE, EXTENDED RELEASE 24HR ORAL
COMMUNITY
Start: 2018-01-15 | End: 2018-05-21

## 2018-01-30 RX ORDER — RIZATRIPTAN BENZOATE 10 MG/1
TABLET, ORALLY DISINTEGRATING ORAL
Refills: 3 | COMMUNITY
Start: 2017-12-11 | End: 2022-02-02

## 2018-01-30 RX ORDER — MIRABEGRON 50 MG/1
TABLET, FILM COATED, EXTENDED RELEASE ORAL
COMMUNITY
Start: 2018-01-27 | End: 2018-05-21

## 2018-01-30 NOTE — PROGRESS NOTES
Chief Complaint  Chhaya Wyatt is a 43 y.o. female who presents for Nausea (x2-3 months. Every afternoon, never throws up. Goes away at night at bedtime. ) and Fatigue (x2-3 months, gets fatigued afternoon when she starts getting nauseated. Goes away at bedtime. )  .    History of Present Illness   Chhaya is here for acute care for a new issue which has been ongoing for about 2-3 months.  Everyday starting between 3-5 pm she feels exhausted and nauseated and it lasts until she goes to bed.  She wakes up in the morning and feels completely fine.  No polyuria or polydipsia.   She has thought maybe it was blood sugar dropping, so she tried eating.  This doesn't help.  She also has some heart palpitations at night.  She thought maybe it was anxiety causing this.  Her prozac was increased over a month ago.  She takes her myrbetriq in the morning.  She also takes her propranolol in the morning.  She eats lunch about 11:45 or 12.  The sensation that she gets is like a sour stomach.    Her boyfriend has had a vasectomy and she has had an ablation, so pregnancy chance is not really there.  No fevers or chills or night sweats.      Review of Systems   Constitution: Positive for malaise/fatigue. Negative for chills, fever, night sweats, weight gain and weight loss.   Cardiovascular: Positive for palpitations. Negative for chest pain.   Endocrine: Negative for polydipsia and polyuria.   Gastrointestinal: Positive for heartburn and nausea. Negative for change in bowel habit and vomiting.   Neurological: Negative for dizziness and light-headedness.       Patient Active Problem List   Diagnosis   • Anxiety   • Migraine   • Migraine without aura and responsive to treatment   • Panic attack   • Vitamin D deficiency   • Prediabetes   • Urine frequency   • Urine frequency       Past Medical History:   Diagnosis Date   • Anxiety    • Depression    • Migraine        Past Surgical History:   Procedure Laterality Date   • CHOLECYSTECTOMY      • ENDOMETRIAL ABLATION     • RHINOPLASTY     • TONSILLECTOMY         Family History   Problem Relation Age of Onset   • Basal cell carcinoma Mother    • Basal cell carcinoma Father    • Breast cancer Maternal Grandmother    • Colon cancer Maternal Grandmother    • Breast cancer Paternal Grandmother        Social History     Social History   • Marital status:      Spouse name: N/A   • Number of children: N/A   • Years of education: N/A     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     • [DISCONTINUED] FLUoxetine (PROzac) 20 MG capsule TAKE 1 CAPSULE EVERY DAY 90 capsule 3   • [DISCONTINUED] Mirabegron ER (MYRBETRIQ) 25 MG tablet sustained-release 24 hour 24 hr tablet Take 25 mg by mouth.     • [DISCONTINUED] nitrofurantoin, macrocrystal-monohydrate, (MACROBID) 100 MG capsule Take 1 capsule by mouth Every 12 (Twelve) Hours. 10 capsule 0   • [DISCONTINUED] phenazopyridine (PYRIDIUM) 100 MG tablet Take 1 tablet by mouth 3 (Three) Times a Day As Needed for bladder spasms. 6 tablet 0   • [DISCONTINUED] propranolol (INDERAL) 20 MG tablet TAKE 1 TABLET TWICE DAILY. 180 tablet 0   • [DISCONTINUED] SUMAtriptan Succinate 3 MG/0.5ML solution auto-injector Inject 3 mg under the skin.       No current facility-administered medications on file prior to visit.        Allergies   Allergen Reactions   • Bacitracin Rash       Vitals:    01/30/18 1145   BP: 112/80   Pulse: 67   Resp: 18   SpO2: 98%   Weight: 79.4 kg (175 lb)   Height:  "177.8 cm (70\")       Body mass index is 25.11 kg/(m^2).    Objective   Physical Exam   Constitutional: She is oriented to person, place, and time. She appears well-developed and well-nourished. No distress.   HENT:   Head: Normocephalic and atraumatic.   Eyes: Conjunctivae are normal. No scleral icterus.   Neck: Normal range of motion. Neck supple.   Cardiovascular: Normal rate, regular rhythm and normal heart sounds.  Exam reveals no gallop and no friction rub.    No murmur heard.  Pulmonary/Chest: Effort normal and breath sounds normal. No respiratory distress. She has no wheezes. She has no rales.   Abdominal: Soft. Bowel sounds are normal. She exhibits no distension. There is generalized tenderness. There is no rebound and no guarding.   Musculoskeletal: She exhibits no edema.   Lymphadenopathy:     She has no cervical adenopathy.   Neurological: She is alert and oriented to person, place, and time. No cranial nerve deficit.   Psychiatric: She has a normal mood and affect. Her behavior is normal. Judgment and thought content normal.       Results for orders placed or performed in visit on 01/18/18   POCT urinalysis dipstick, automated   Result Value Ref Range    Color Dark Yellow Yellow, Straw, Dark Yellow, Rabia    Clarity, UA Cloudy (A) Clear    Glucose, UA Negative Negative, 1000 mg/dL (3+) mg/dL    Bilirubin Negative Negative    Ketones, UA Negative Negative    Specific Gravity  1.010 1.005 - 1.030    Blood, UA Moderate (A) Negative    pH, Urine 7.5 5.0 - 8.0    Protein,  mg/dL (A) Negative mg/dL    Urobilinogen, UA 1 E.U./dL  (A) Normal    Leukocytes Moderate (2+) (A) Negative    Nitrite, UA Negative Negative       Assessment/Plan   Diagnoses and all orders for this visit:    Fatigue, unspecified type  -     Comprehensive Metabolic Panel  -     TSH  -     CBC & Differential    Nausea without vomiting  -     Comprehensive Metabolic Panel  -     TSH  -     CBC & Differential    Prediabetes  -     " Hemoglobin A1c    Other orders  -     FLUoxetine (PROzac) 40 MG capsule; TAKE ONE CAPSULE BY MOUTH DAILY  -     MYRBETRIQ 50 MG tablet sustained-release 24 hour 24 hr tablet;   -     propranolol LA (INDERAL LA) 60 MG 24 hr capsule;   -     rizatriptan MLT (MAXALT-MLT) 10 MG disintegrating tablet; TAKE 1 TABLET BY MOUTH ONCE AS NEEDED (HEADACHE) , MAY REPEAT ONCE IN 2HRS-MAX 30MG/24 HRS      Discussion/Summary  Chhaya is here for acute care for new issues.  Will get basic labs today.  I have asked her to take nexium or prilosec otc daily for two weeks to see if this helps her symptoms go away.  If her labs look ok and if her symptoms do not resolve in two weeks, will refer to GI for EGD.    No Follow-up on file.

## 2018-01-31 LAB
ALBUMIN SERPL-MCNC: 4.6 G/DL (ref 3.5–5.2)
ALBUMIN/GLOB SERPL: 1.7 G/DL
ALP SERPL-CCNC: 59 U/L (ref 39–117)
ALT SERPL-CCNC: 11 U/L (ref 1–33)
AST SERPL-CCNC: 15 U/L (ref 1–32)
BASOPHILS # BLD AUTO: 0.02 10*3/MM3 (ref 0–0.2)
BASOPHILS NFR BLD AUTO: 0.3 % (ref 0–1.5)
BILIRUB SERPL-MCNC: 0.3 MG/DL (ref 0.1–1.2)
BUN SERPL-MCNC: 16 MG/DL (ref 6–20)
BUN/CREAT SERPL: 17.2 (ref 7–25)
CALCIUM SERPL-MCNC: 9.4 MG/DL (ref 8.6–10.5)
CHLORIDE SERPL-SCNC: 101 MMOL/L (ref 98–107)
CO2 SERPL-SCNC: 24.4 MMOL/L (ref 22–29)
CREAT SERPL-MCNC: 0.93 MG/DL (ref 0.57–1)
EOSINOPHIL # BLD AUTO: 0.2 10*3/MM3 (ref 0–0.7)
EOSINOPHIL NFR BLD AUTO: 2.8 % (ref 0.3–6.2)
ERYTHROCYTE [DISTWIDTH] IN BLOOD BY AUTOMATED COUNT: 13 % (ref 11.7–13)
GFR SERPLBLD CREATININE-BSD FMLA CKD-EPI: 66 ML/MIN/1.73
GFR SERPLBLD CREATININE-BSD FMLA CKD-EPI: 80 ML/MIN/1.73
GLOBULIN SER CALC-MCNC: 2.7 GM/DL
GLUCOSE SERPL-MCNC: 85 MG/DL (ref 65–99)
HBA1C MFR BLD: 5.1 % (ref 4.8–5.6)
HCT VFR BLD AUTO: 39.5 % (ref 35.6–45.5)
HGB BLD-MCNC: 12.5 G/DL (ref 11.9–15.5)
IMM GRANULOCYTES # BLD: 0 10*3/MM3 (ref 0–0.03)
IMM GRANULOCYTES NFR BLD: 0 % (ref 0–0.5)
LYMPHOCYTES # BLD AUTO: 1.96 10*3/MM3 (ref 0.9–4.8)
LYMPHOCYTES NFR BLD AUTO: 27.1 % (ref 19.6–45.3)
MCH RBC QN AUTO: 26.9 PG (ref 26.9–32)
MCHC RBC AUTO-ENTMCNC: 31.6 G/DL (ref 32.4–36.3)
MCV RBC AUTO: 85.1 FL (ref 80.5–98.2)
MONOCYTES # BLD AUTO: 0.67 10*3/MM3 (ref 0.2–1.2)
MONOCYTES NFR BLD AUTO: 9.3 % (ref 5–12)
NEUTROPHILS # BLD AUTO: 4.39 10*3/MM3 (ref 1.9–8.1)
NEUTROPHILS NFR BLD AUTO: 60.5 % (ref 42.7–76)
PLATELET # BLD AUTO: 277 10*3/MM3 (ref 140–500)
POTASSIUM SERPL-SCNC: 4.5 MMOL/L (ref 3.5–5.2)
PROT SERPL-MCNC: 7.3 G/DL (ref 6–8.5)
RBC # BLD AUTO: 4.64 10*6/MM3 (ref 3.9–5.2)
SODIUM SERPL-SCNC: 139 MMOL/L (ref 136–145)
TSH SERPL DL<=0.005 MIU/L-ACNC: 1 MIU/ML (ref 0.27–4.2)
WBC # BLD AUTO: 7.24 10*3/MM3 (ref 4.5–10.7)

## 2018-02-21 DIAGNOSIS — R11.0 NAUSEA: Primary | ICD-10-CM

## 2018-05-15 ENCOUNTER — TELEPHONE (OUTPATIENT)
Dept: ORTHOPEDIC SURGERY | Facility: CLINIC | Age: 43
End: 2018-05-15

## 2018-05-15 ENCOUNTER — OFFICE VISIT (OUTPATIENT)
Dept: INTERNAL MEDICINE | Facility: CLINIC | Age: 43
End: 2018-05-15

## 2018-05-15 VITALS
BODY MASS INDEX: 25.05 KG/M2 | WEIGHT: 175 LBS | SYSTOLIC BLOOD PRESSURE: 130 MMHG | RESPIRATION RATE: 18 BRPM | DIASTOLIC BLOOD PRESSURE: 82 MMHG | HEART RATE: 75 BPM | OXYGEN SATURATION: 99 % | HEIGHT: 70 IN

## 2018-05-15 DIAGNOSIS — S99.911A INJURY OF RIGHT ANKLE, INITIAL ENCOUNTER: Primary | ICD-10-CM

## 2018-05-15 DIAGNOSIS — M25.471 RIGHT ANKLE SWELLING: ICD-10-CM

## 2018-05-15 DIAGNOSIS — S93.491A HIGH ANKLE SPRAIN OF RIGHT LOWER EXTREMITY, INITIAL ENCOUNTER: ICD-10-CM

## 2018-05-15 DIAGNOSIS — M25.571 ACUTE RIGHT ANKLE PAIN: ICD-10-CM

## 2018-05-15 PROCEDURE — 73610 X-RAY EXAM OF ANKLE: CPT | Performed by: INTERNAL MEDICINE

## 2018-05-15 PROCEDURE — 99214 OFFICE O/P EST MOD 30 MIN: CPT | Performed by: INTERNAL MEDICINE

## 2018-05-15 NOTE — PROGRESS NOTES
"Chief Complaint  Chhaya Wyatt is a 43 y.o. female who presents for Ankle Injury (Started working out for vacation, got up to running 10 minutes at a time. [id number] ago pain that runs up the top of her foot to her knee. )  .    History of Present Illness   Chhaya is here for acute care for a new issue. She started running on the treadmill at home.  Her right ankle started hurting acutely while running about 10 days ago.  She kept running.  It has progressively gotten more and more swollen.  It hurts to bear weight/walk on it.  She tried putting an otc brace on it but it felt worse.   She has been taking ibuprofen and has iced it off and on.  No twisting or turning injury.  No falls.  Her ankle feels \"weak\" since the injury.        Review of Systems   Skin: Negative for color change.   Musculoskeletal: Positive for joint pain (right ankle) and joint swelling (right ankle). Negative for falls.       Patient Active Problem List   Diagnosis   • Anxiety   • Migraine   • Migraine without aura and responsive to treatment   • Panic attack   • Vitamin D deficiency   • Prediabetes   • Urine frequency   • Urine frequency       Past Medical History:   Diagnosis Date   • Anxiety    • Depression    • Migraine        Past Surgical History:   Procedure Laterality Date   • CHOLECYSTECTOMY     • ENDOMETRIAL ABLATION     • RHINOPLASTY     • TONSILLECTOMY         Family History   Problem Relation Age of Onset   • Basal cell carcinoma Mother    • Basal cell carcinoma Father    • Breast cancer Maternal Grandmother    • Colon cancer Maternal Grandmother    • Breast cancer Paternal Grandmother        Social History     Social History   • Marital status:      Spouse name: N/A   • Number of children: N/A   • Years of education: N/A     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[employer]      Social History Main Topics   • Smoking status: Never Smoker   • Smokeless tobacco: Never Used   • Alcohol use No   • Drug use: No   • Sexual activity: Not " "on file     Other Topics Concern   • Not on file     Social History Narrative   • No narrative on file       Current Outpatient Prescriptions on File Prior to Visit   Medication Sig Dispense Refill   • ALPRAZolam (XANAX) 0.25 MG tablet TAKE ONE TABLET BY MOUTH EVERY 8 HOURS AS NEEDED 6 tablet 2   • MYRBETRIQ 50 MG tablet sustained-release 24 hour 24 hr tablet      • rizatriptan MLT (MAXALT-MLT) 10 MG disintegrating tablet TAKE 1 TABLET BY MOUTH ONCE AS NEEDED (HEADACHE) , MAY REPEAT ONCE IN 2HRS-MAX 30MG/24 HRS  3   • FLUoxetine (PROzac) 40 MG capsule TAKE ONE CAPSULE BY MOUTH DAILY  0   • promethazine (PHENERGAN) 25 MG tablet Take 25 mg by mouth.     • propranolol LA (INDERAL LA) 60 MG 24 hr capsule        No current facility-administered medications on file prior to visit.        Allergies   Allergen Reactions   • Bacitracin Rash       Vitals:    05/15/18 1516   BP: 130/82   Pulse: 75   Resp: 18   SpO2: 99%   Weight: 79.4 kg (175 lb)   Height: 177.8 cm (70\")       Body mass index is 25.11 kg/m².    Objective   Physical Exam   Constitutional: She appears well-developed and well-nourished.   Musculoskeletal:        Right ankle: She exhibits decreased range of motion (secondary to pain) and swelling (anteriorly and laterally). She exhibits no ecchymosis, no deformity and normal pulse. Tenderness. No lateral malleolus and no medial malleolus tenderness found. Achilles tendon exhibits no pain and no defect.            xr right ankle 3 v:   Reason for exam: pain, swelling,   Comparison: none  Impression: neg for fx      Assessment/Plan   Diagnoses and all orders for this visit:    Injury of right ankle, initial encounter  -     XR Ankle 3+ View Right (In Office)  -     Ambulatory Referral to Orthopedic Surgery    Acute right ankle pain  -     XR Ankle 3+ View Right (In Office)  -     Ambulatory Referral to Orthopedic Surgery    Right ankle swelling  -     XR Ankle 3+ View Right (In Office)  -     Ambulatory Referral to " Orthopedic Surgery    High ankle sprain of right lower extremity, initial encounter  -     Ambulatory Referral to Orthopedic Surgery        Discussion/Summary  Chhaya is here for acute care for a new issue.  I do not see any fx on her xray.  I would like to get her in to see ortho so her ankle can be splinted.  Recommend cont ice and nsaids.    No Follow-up on file.

## 2018-05-17 ENCOUNTER — HOSPITAL ENCOUNTER (OUTPATIENT)
Dept: MRI IMAGING | Facility: HOSPITAL | Age: 43
Discharge: HOME | End: 2018-05-17
Attending: ORTHOPAEDIC SURGERY | Admitting: ORTHOPAEDIC SURGERY

## 2018-05-17 ENCOUNTER — OFFICE VISIT (OUTPATIENT)
Dept: ORTHOPEDIC SURGERY | Facility: CLINIC | Age: 43
End: 2018-05-17

## 2018-05-17 VITALS — HEIGHT: 70 IN | TEMPERATURE: 98.4 F | BODY MASS INDEX: 25.14 KG/M2 | WEIGHT: 175.6 LBS

## 2018-05-17 DIAGNOSIS — M84.361A STRESS FRACTURE OF RIGHT TIBIA, INITIAL ENCOUNTER: ICD-10-CM

## 2018-05-17 DIAGNOSIS — M25.571 RIGHT ANKLE PAIN, UNSPECIFIED CHRONICITY: ICD-10-CM

## 2018-05-17 DIAGNOSIS — M84.363A STRESS FRACTURE OF RIGHT FIBULA, INITIAL ENCOUNTER: ICD-10-CM

## 2018-05-17 DIAGNOSIS — M79.661 PAIN OF RIGHT LOWER LEG: ICD-10-CM

## 2018-05-17 DIAGNOSIS — M79.661 PAIN OF RIGHT LOWER LEG: Primary | ICD-10-CM

## 2018-05-17 PROCEDURE — 99244 OFF/OP CNSLTJ NEW/EST MOD 40: CPT | Performed by: ORTHOPAEDIC SURGERY

## 2018-05-17 PROCEDURE — 73590 X-RAY EXAM OF LOWER LEG: CPT | Performed by: ORTHOPAEDIC SURGERY

## 2018-05-17 PROCEDURE — 73718 MRI LOWER EXTREMITY W/O DYE: CPT

## 2018-05-17 PROCEDURE — 73610 X-RAY EXAM OF ANKLE: CPT | Performed by: ORTHOPAEDIC SURGERY

## 2018-05-17 NOTE — PROGRESS NOTES
New Patient Complaint      Patient: Chhaya Wyatt  YOB: 1975 43 y.o. female  Medical Record Number: [id number]    Chief Complaints: My ankle and leg hurt    History of Present Illness:    Patient reports a 12 day history of progressively worsening moderate intermittent stabbing aching pain over the anterior aspect of the right distal tibia and ankle associated with redness bruising and swelling symptoms are worse with driving and running.  Symptoms began after she was trying to increase activity and running on a treadmill.    She was seen by her primary care physician where x-rays were made and she was sent here today for further evaluation.    She was seen today at the request of Dr. Candace Hopper who is requested my opinion regarding etiology and treatment of this condition.      HPI    Allergies:   Allergies   Allergen Reactions   • Bacitracin Rash       Medications:   Current Outpatient Prescriptions on File Prior to Visit   Medication Sig   • ALPRAZolam (XANAX) 0.25 MG tablet TAKE ONE TABLET BY MOUTH EVERY 8 HOURS AS NEEDED   • FLUoxetine (PROzac) 40 MG capsule TAKE ONE CAPSULE BY MOUTH DAILY   • MYRBETRIQ 50 MG tablet sustained-release 24 hour 24 hr tablet    • promethazine (PHENERGAN) 25 MG tablet Take 25 mg by mouth.   • propranolol LA (INDERAL LA) 60 MG 24 hr capsule    • rizatriptan MLT (MAXALT-MLT) 10 MG disintegrating tablet TAKE 1 TABLET BY MOUTH ONCE AS NEEDED (HEADACHE) , MAY REPEAT ONCE IN 2HRS-MAX 30MG/24 HRS     No current facility-administered medications on file prior to visit.        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[employer]      Social History Main Topics   • Smoking status: Never Smoker   • Smokeless tobacco: Never Used   • Alcohol  "use No   • Drug use: No   • Sexual activity: Not on file      Social History     Social History Narrative   • No narrative on file     Family History   Problem Relation Age of Onset   • Basal cell carcinoma Mother    • Basal cell carcinoma Father    • Breast cancer Maternal Grandmother    • Colon cancer Maternal Grandmother    • Breast cancer Paternal Grandmother        Review of Systems: 14 point review of systems performed, positive pertinent findings identified in HPI. All remaining systems negativeExcept I pain, headaches, anxiety or depression     Review of Systems      Physical Exam:   Vitals:    05/17/18 1045   Temp: 98.4 °F (36.9 °C)   Weight: 79.7 kg (175 lb 9.6 oz)   Height: 177.8 cm (70\")     Physical Exam   Constitutional: pleasant, well developed   Eyes: sclera non icteric  Hearing : adequate for exam  Cardiovascular: palpable pulses in right foot, right calf/ thigh NT without sign of DVT  Respiratoy: breathing unlabored   Neurological: grossly sensate to LT throughout right LE  Psychiatric: oriented with normal mood and affect.   Lymphatic: No palpable popliteal lymphadenopathy right LE  Skin: intact throughout right leg/foot  Musculoskeletal: Right leg shows some mild swelling about the ankle.  There is mild discomfort palpation over the distal tibia at the junction of the proximal 2/3/distal one third.  There is also slight discomfort along the fibula but less so than to the tibia.  There is mild discomfort over the anterior aspect of the ankle but no focal tenderness over the dorsum of the midfoot or calcaneus.  She's able to actively dorsiflex the ankle without any palpable defects of the anterior tib tendon.  She's able to actively invert as well with no focal posterior tib tendon  Physical Exam  Ortho Exam    Radiology: 3 views the right ankle and 2 views the right tib-fib were ordered today to evaluate pain and reviewed.  We were unable to access any prior x-rays.  Talus appears well seated " within the mortise.  There is an area of abnormality proximally 4.5 cm proximal tip of the fibula that may be a nondisplaced evolving stress fracture.    There is also a questionable area approximately 12 cm proximal to the joint surface on the tibia that may be a nondisplaced evolving stress fracture    Assessment/Plan: 1.  Right ankle/leg pain with increased activity with probable stress fractures    She was fitted with a boot today to wear for any weightbearing activities as well as use of crutches for partial weightbearing only.  She does not need to sleep in this.    When get an MRI of her right tib-fib to evaluate for stress fracture at the junction of the distal one thirds/proximal two thirds of the tibia as well as of the distal portion of the fibula.    We discussed vitamin D and she has been told in the past that she was low and took some in the past but has not taken any in over a year now.    If she does have a stress fracture we may need to check her vitamin D and augment this.    She understands to call to schedule follow-up appointment after MRI has been scheduled ordered to review results in the office.    Recommend that she see her primary care physician for non-orthopedic related issues outlined in review of symptoms  Answers for HPI/ROS submitted by the patient on 5/16/2018   Lower extremity pain  Incident occurred: more than 1 week ago  Incident location: at home  Injury mechanism: unknown  Pain location: right ankle  Pain quality: aching, shooting  Pain - numeric: 5/10  Pain course: intermittent  inability to bear weight: Yes  muscle weakness: Yes  Foreign body present: no foreign bodies

## 2018-05-21 ENCOUNTER — OFFICE VISIT (OUTPATIENT)
Dept: ORTHOPEDIC SURGERY | Facility: CLINIC | Age: 43
End: 2018-05-21

## 2018-05-21 VITALS — WEIGHT: 175 LBS | BODY MASS INDEX: 25.05 KG/M2 | HEIGHT: 70 IN | TEMPERATURE: 97.6 F

## 2018-05-21 DIAGNOSIS — M86.9: Primary | ICD-10-CM

## 2018-05-21 PROCEDURE — 99213 OFFICE O/P EST LOW 20 MIN: CPT | Performed by: ORTHOPAEDIC SURGERY

## 2018-05-21 RX ORDER — MELOXICAM 15 MG/1
TABLET ORAL
Qty: 30 TABLET | Refills: 1 | Status: SHIPPED | OUTPATIENT
Start: 2018-05-21 | End: 2018-08-20

## 2018-05-21 NOTE — PROGRESS NOTES
"Ankle Follow Up      Patient: Chhaya Wyatt    YOB: 1975 43 y.o. female    Chief Complaints: Ankle feels worse in boot but swelling has improved    History of Present Illness: She was seen last week with a 12 day history of progressive worsening moderate intermittent stabbing pain over the anterior aspect of the right distal tibia and ankle.  She was placed into a boot and says it is more bothersome in the boot and when she \"just babysit\".  Her swelling has improved she has only slight intermittent aching pain over the anterior aspect of the right ankle and distal leg.  HPI    ROS: ankle pain no numbness or tingling  Past Medical History:   Diagnosis Date   • Anxiety    • Depression    • Migraine        Physical Exam:   Vitals:    05/21/18 1307   Temp: 97.6 °F (36.4 °C)   Weight: 79.4 kg (175 lb)   Height: 177.8 cm (70\")   PainSc: 0-No pain  Comment: resting, but when it is in the Boot it can be around a 4 or 5     Well developed with normal mood.She is with a friend.  Right leg shows less swelling no calf tenderness.  There was minimal discomfort along the anterior tibia and ankle without appreciable swelling or effusion to the ankle.  She is able to actively dorsiflex to about 5-10° beyond neutral and no focal pain today along the fibula.  Grossly sensate light touch throughout the right foot      Radiology: MRI films and report of the right tib-fib dated 5/17/18 reviewed which shows some pretibial edema within the subcutaneous fat extending along the periosteum of the mid tibial shaft no cortical abnormality was noted calf musculature appeared normal      Assessment/Plan:  Right tibial periostitis    I reviewed the etiology of this in detail with she and her friend and nothing I would recommend from a surgical standpoint she may start weaning off of her crutches and out of her boot as pain allows.  I demonstrated stretching exercises for her to do and to avoid impact activities.  She may do exercise " bike and use ice.    We'll also start meloxicam 15 oh grams 1 by mouth daily with GI precautions #30 with 1 refill.    She declined any formal therapy today but if she does not improve over the next several weeks she will let me know if she was to start therapy    We had a pleasant visit I will see her back in 6 weeks

## 2018-08-20 ENCOUNTER — OFFICE VISIT (OUTPATIENT)
Dept: GASTROENTEROLOGY | Facility: CLINIC | Age: 43
End: 2018-08-20

## 2018-08-20 VITALS
WEIGHT: 167.8 LBS | BODY MASS INDEX: 24.02 KG/M2 | DIASTOLIC BLOOD PRESSURE: 84 MMHG | SYSTOLIC BLOOD PRESSURE: 112 MMHG | HEIGHT: 70 IN | TEMPERATURE: 98.2 F

## 2018-08-20 DIAGNOSIS — R10.13 DYSPEPSIA: ICD-10-CM

## 2018-08-20 DIAGNOSIS — R11.0 NAUSEA: Primary | ICD-10-CM

## 2018-08-20 PROCEDURE — 99203 OFFICE O/P NEW LOW 30 MIN: CPT | Performed by: INTERNAL MEDICINE

## 2018-08-20 RX ORDER — SUCRALFATE 1 G/1
1 TABLET ORAL 4 TIMES DAILY PRN
Qty: 90 TABLET | Refills: 1 | Status: SHIPPED | OUTPATIENT
Start: 2018-08-20 | End: 2022-02-02

## 2018-08-20 RX ORDER — ACETAZOLAMIDE 250 MG/1
250 TABLET ORAL DAILY PRN
Refills: 0 | COMMUNITY
Start: 2018-08-13 | End: 2018-12-18

## 2018-08-20 RX ORDER — TOPIRAMATE 50 MG/1
50 TABLET, FILM COATED ORAL
Refills: 3 | COMMUNITY
Start: 2018-08-13 | End: 2018-12-18

## 2018-08-20 NOTE — PROGRESS NOTES
Chief Complaint   Patient presents with   • Nausea     Chhaya Wyatt is a 43 y.o. female who presents with nausea. This happens between 4:30-6 about 5 out of 7 days per week. This has been going on since the beginning of the year.  She tried stopping all of her medications and this did not help. She resumed her topamax last week.      She never throws up.  She does not have a good appetite.  She has lost about 10 lbs.  No change in BM -no blood in stool.  She decribes it as a sour stomach - right in the midabdomen.      She tried 2 weeks of omeprazole and 2 weeks of zantac.      She has had previous colonoscopies in the past - she was told she had an ileus.  She had no polyps - had 2 previous exams - last exam 10 years ago.  Her mother has had polyps.  Her mother may have had CRC in her 50s.  HPI  Past Medical History:   Diagnosis Date   • Anxiety    • Depression    • Migraine      Past Surgical History:   Procedure Laterality Date   • CHOLECYSTECTOMY     • ENDOMETRIAL ABLATION     • RHINOPLASTY     • TONSILLECTOMY         Current Outpatient Prescriptions:   •  acetaZOLAMIDE (DIAMOX) 250 MG tablet, Take 250 mg by mouth Daily As Needed., Disp: , Rfl: 0  •  ALPRAZolam (XANAX) 0.25 MG tablet, TAKE ONE TABLET BY MOUTH EVERY 8 HOURS AS NEEDED, Disp: 6 tablet, Rfl: 2  •  rizatriptan MLT (MAXALT-MLT) 10 MG disintegrating tablet, TAKE 1 TABLET BY MOUTH ONCE AS NEEDED (HEADACHE) , MAY REPEAT ONCE IN 2HRS-MAX 30MG/24 HRS, Disp: , Rfl: 3  •  topiramate (TOPAMAX) 50 MG tablet, Take 50 mg by mouth every night at bedtime., Disp: , Rfl: 3  •  sucralfate (CARAFATE) 1 g tablet, Take 1 tablet by mouth 4 (Four) Times a Day As Needed (nausea, sour stomach)., Disp: 90 tablet, Rfl: 1  Allergies   Allergen Reactions   • Bacitracin Rash     Social History     Social History   • Marital status:      Spouse name: N/A   • Number of children: N/A   • Years of education: N/A     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   Gastrointestinal: Positive for abdominal pain and nausea. Negative for blood in stool, constipation, diarrhea and vomiting.   All other systems reviewed and are negative.    Vitals:    08/20/18 0952   BP: 112/84   Temp: 98.2 °F (36.8 °C)     1    08/20/18 0952   Weight: 76.1 kg (167 lb 12.8 oz)     Physical Exam   Constitutional: She appears well-developed and well-nourished.   HENT:   Head: Normocephalic and atraumatic.   Eyes: No scleral icterus.   Abdominal: Soft. She exhibits no distension and no mass. There is no tenderness.   Neurological: She is alert.   Skin: Skin is warm and dry.   Psychiatric: She has a normal mood and affect.     No images are attached to the encounter.  No notes on file  Chhaya was seen today for nausea.    Diagnoses and all orders for this visit:    Nausea  -     Case Request; Standing  -     Case Request    Dyspepsia  -     Case Request; Standing  -     Case Request    Other orders  -     sucralfate (CARAFATE) 1 g tablet; Take 1 tablet by mouth 4 (Four) Times a Day As Needed (nausea, sour stomach).  -     Follow Anesthesia Guidelines / Standing Orders; Future  -     Implement Anesthesia orders day of procedure.; Standing  -     Obtain informed consent; Standing    Plan:  - trial carafate  - schedule egd for further evaluation given chronic symptoms at this point with associated weight loss and no response to  acid suppression

## 2018-10-08 ENCOUNTER — ANESTHESIA EVENT (OUTPATIENT)
Dept: GASTROENTEROLOGY | Facility: HOSPITAL | Age: 43
End: 2018-10-08

## 2018-10-08 ENCOUNTER — ANESTHESIA (OUTPATIENT)
Dept: GASTROENTEROLOGY | Facility: HOSPITAL | Age: 43
End: 2018-10-08

## 2018-10-08 ENCOUNTER — HOSPITAL ENCOUNTER (OUTPATIENT)
Facility: HOSPITAL | Age: 43
Setting detail: HOSPITAL OUTPATIENT SURGERY
Discharge: HOME | End: 2018-10-08
Attending: INTERNAL MEDICINE | Admitting: INTERNAL MEDICINE

## 2018-10-08 VITALS
SYSTOLIC BLOOD PRESSURE: 129 MMHG | BODY MASS INDEX: 22.65 KG/M2 | OXYGEN SATURATION: 100 % | RESPIRATION RATE: 16 BRPM | DIASTOLIC BLOOD PRESSURE: 83 MMHG | HEIGHT: 70 IN | TEMPERATURE: 98.4 F | WEIGHT: 158.25 LBS | HEART RATE: 73 BPM

## 2018-10-08 DIAGNOSIS — R11.0 NAUSEA: ICD-10-CM

## 2018-10-08 DIAGNOSIS — R10.13 DYSPEPSIA: ICD-10-CM

## 2018-10-08 LAB
B-HCG UR QL: NEGATIVE
INTERNAL NEGATIVE CONTROL: NEGATIVE
INTERNAL POSITIVE CONTROL: POSITIVE
Lab: NORMAL

## 2018-10-08 PROCEDURE — S0260 H&P FOR SURGERY: HCPCS | Performed by: INTERNAL MEDICINE

## 2018-10-08 PROCEDURE — 88312 SPECIAL STAINS GROUP 1: CPT | Performed by: INTERNAL MEDICINE

## 2018-10-08 PROCEDURE — 81025 URINE PREGNANCY TEST: CPT | Performed by: INTERNAL MEDICINE

## 2018-10-08 PROCEDURE — 88305 TISSUE EXAM BY PATHOLOGIST: CPT | Performed by: INTERNAL MEDICINE

## 2018-10-08 PROCEDURE — 43239 EGD BIOPSY SINGLE/MULTIPLE: CPT | Performed by: INTERNAL MEDICINE

## 2018-10-08 PROCEDURE — 25010000002 PROPOFOL 10 MG/ML EMULSION: Performed by: ANESTHESIOLOGY

## 2018-10-08 RX ORDER — PROPOFOL 10 MG/ML
VIAL (ML) INTRAVENOUS CONTINUOUS PRN
Status: DISCONTINUED | OUTPATIENT
Start: 2018-10-08 | End: 2018-10-08 | Stop reason: SURG

## 2018-10-08 RX ORDER — SODIUM CHLORIDE 0.9 % (FLUSH) 0.9 %
3 SYRINGE (ML) INJECTION AS NEEDED
Status: DISCONTINUED | OUTPATIENT
Start: 2018-10-08 | End: 2018-10-08 | Stop reason: HOSPADM

## 2018-10-08 RX ORDER — LIDOCAINE HYDROCHLORIDE 20 MG/ML
INJECTION, SOLUTION INFILTRATION; PERINEURAL AS NEEDED
Status: DISCONTINUED | OUTPATIENT
Start: 2018-10-08 | End: 2018-10-08 | Stop reason: SURG

## 2018-10-08 RX ORDER — SODIUM CHLORIDE, SODIUM LACTATE, POTASSIUM CHLORIDE, CALCIUM CHLORIDE 600; 310; 30; 20 MG/100ML; MG/100ML; MG/100ML; MG/100ML
1000 INJECTION, SOLUTION INTRAVENOUS CONTINUOUS
Status: DISCONTINUED | OUTPATIENT
Start: 2018-10-08 | End: 2018-10-08 | Stop reason: HOSPADM

## 2018-10-08 RX ORDER — PROPOFOL 10 MG/ML
VIAL (ML) INTRAVENOUS AS NEEDED
Status: DISCONTINUED | OUTPATIENT
Start: 2018-10-08 | End: 2018-10-08 | Stop reason: SURG

## 2018-10-08 RX ADMIN — PROPOFOL 125 MG: 10 INJECTION, EMULSION INTRAVENOUS at 14:19

## 2018-10-08 RX ADMIN — SODIUM CHLORIDE, POTASSIUM CHLORIDE, SODIUM LACTATE AND CALCIUM CHLORIDE 1000 ML: 600; 310; 30; 20 INJECTION, SOLUTION INTRAVENOUS at 14:04

## 2018-10-08 RX ADMIN — LIDOCAINE HYDROCHLORIDE 50 MG: 20 INJECTION, SOLUTION INFILTRATION; PERINEURAL at 14:19

## 2018-10-08 RX ADMIN — PROPOFOL 140 MCG/KG/MIN: 10 INJECTION, EMULSION INTRAVENOUS at 14:19

## 2018-10-08 NOTE — ANESTHESIA PREPROCEDURE EVALUATION
Anesthesia Evaluation     Patient summary reviewed                Airway   Mallampati: I  TM distance: >3 FB  Neck ROM: full  No difficulty expected  Dental - normal exam     Pulmonary    Cardiovascular   Exercise tolerance: good (4-7 METS)    Rhythm: regular  Rate: normal        Neuro/Psych  GI/Hepatic/Renal/Endo      Musculoskeletal     Abdominal    Substance History      OB/GYN          Other                        Anesthesia Plan    ASA 1     MAC     intravenous induction   Anesthetic plan, all risks, benefits, and alternatives have been provided, discussed and informed consent has been obtained with: patient.

## 2018-10-08 NOTE — DISCHARGE INSTRUCTIONS
Esophagogastroduodenoscopy, Care After  Dr Howe [phone]  Call ofice in 2 weeks if you have not received pathology results  Refer to this sheet in the next few weeks. These instructions provide you with information about caring for yourself after your procedure. Your health care provider may also give you more specific instructions. Your treatment has been planned according to current medical practices, but problems sometimes occur. Call your health care provider if you have any problems or questions after your procedure.  What can I expect after the procedure?  After the procedure, it is common to have:  · A sore throat.  · Nausea.  · Bloating.  · Dizziness.  · Fatigue.    Follow these instructions at home:  · Do not drive for 24 hours if you received a medicine to help you relax (sedative).  · If your health care provider took a tissue sample for testing during the procedure, make sure to get your test results. This is your responsibility. Ask your health care provider or the department performing the test when your results will be ready.  · Keep all follow-up visits as told by your health care provider. This is important.  Contact a health care provider if:  · You cannot stop coughing.  · You are not urinating.  · You are urinating less than usual.  Get help right away if:  · You have trouble swallowing.  · You cannot eat or drink.  · You have throat or chest pain that gets worse.  · You are dizzy or light-headed.  · You faint.  · You have nausea or vomiting.  · You have chills.  · You have a fever.  · You have severe abdominal pain.  · You have black, tarry, or bloody stools.  This information is not intended to replace advice given to you by your health care provider. Make sure you discuss any questions you have with your health care provider.  Document Released: 12/04/2013 Document Revised: 05/25/2017 Document Reviewed: 11/10/2016  [employer] Interactive Patient Education © 2018 [employer]

## 2018-10-08 NOTE — H&P
[facility]  Pre Procedure History & Physical    Chief Complaint:   Nausea, weight loss, dyspepsia    Subjective     HPI: Chhaya Wyatt is a 43 y.o. female who presents with nausea. This happens between 4:30-6 about 5 out of 7 days per week. This has been going on since the beginning of the year.  She tried stopping all of her medications and this did not help. She resumed her topamax last week.       She never throws up.  She does not have a good appetite.  She has lost about 10 lbs.  No change in BM -no blood in stool.  She decribes it as a sour stomach - right in the midabdomen.       She tried 2 weeks of omeprazole and 2 weeks of zantac.       She has had previous colonoscopies in the past - she was told she had an ileus.  She had no polyps - had 2 previous exams - last exam 10 years ago.  Her mother has had polyps.  Her mother may have had CRC in her 50s.      Past Medical History:   Past Medical History:   Diagnosis Date   • Anxiety    • Depression    • Migraine    • PONV (postoperative nausea and vomiting)        Past Surgical History:  Past Surgical History:   Procedure Laterality Date   • CHOLECYSTECTOMY     • ENDOMETRIAL ABLATION     • RHINOPLASTY     • TONSILLECTOMY         Family History:  Family History   Problem Relation Age of Onset   • Basal cell carcinoma Mother    • Basal cell carcinoma Father    • Breast cancer Maternal Grandmother    • Colon cancer Maternal Grandmother    • Breast cancer Paternal Grandmother        Social History:   reports that she has never smoked. She has never used smokeless tobacco. She reports that she does not drink alcohol or use drugs.    Medications:   Prescriptions Prior to Admission   Medication Sig Dispense Refill Last Dose   • acetaZOLAMIDE (DIAMOX) 250 MG tablet Take 250 mg by mouth Daily As Needed.  0 Past Month at Unknown time   • ALPRAZolam (XANAX) 0.25 MG tablet TAKE ONE TABLET BY MOUTH EVERY 8 HOURS AS NEEDED 6 tablet 2 Past Month at  "Unknown time   • rizatriptan MLT (MAXALT-MLT) 10 MG disintegrating tablet TAKE 1 TABLET BY MOUTH ONCE AS NEEDED (HEADACHE) , MAY REPEAT ONCE IN 2HRS-MAX 30MG/24 HRS  3 9/28/2018   • sucralfate (CARAFATE) 1 g tablet Take 1 tablet by mouth 4 (Four) Times a Day As Needed (nausea, sour stomach). 90 tablet 1 10/1/2018   • topiramate (TOPAMAX) 50 MG tablet Take 50 mg by mouth every night at bedtime.  3 10/4/2018       Allergies:  Bacitracin    ROS:    Pertinent items are noted in HPI, all other systems reviewed and negative     Objective     Blood pressure (!) 140/106, pulse 96, temperature 99.2 °F (37.3 °C), temperature source Oral, resp. rate 20, height 177.8 cm (70\"), weight 71.8 kg (158 lb 4 oz), last menstrual period 09/24/2018, SpO2 100 %.    Physical Exam   Constitutional: Pt is oriented to person, place, and time and well-developed, well-nourished, and in no distress.   Mouth/Throat: Oropharynx is clear and moist.   Neck: Normal range of motion.   Cardiovascular: Normal rate, regular rhythm      Pulmonary/Chest: Effort normal    Abdominal: Soft. Nontender  Skin: Skin is warm and dry.   Psychiatric: Mood, memory, affect and judgment normal.     Assessment/Plan     Diagnosis:  Nausea, weight loss, dyspepsia    Anticipated Surgical Procedure:  egd/colonoscopy    The risks, benefits, and alternatives of this procedure have been discussed with the patient or the responsible party- the patient understands and agrees to proceed.                                                            "

## 2018-10-08 NOTE — ANESTHESIA POSTPROCEDURE EVALUATION
Patient: Chhaya Wyatt    Procedure Summary     Date:  10/08/18 Room / Location:   [facility]    Anesthesia Start:  1415 Anesthesia Stop:  1431    Procedure:  ESOPHAGOGASTRODUODENOSCOPY WITH COLD BIOPSIES (N/A Esophagus) Diagnosis:       Dyspepsia      Nausea      (Dyspepsia [R10.13])      (Nausea [R11.0])    Surgeon:  Ritu Howe MD Provider:  Miller Mckeon MD    Anesthesia Type:  MAC ASA Status:  1          Anesthesia Type: MAC  Last vitals  BP   (!) 140/106 (10/08/18 1337)   Temp   37.3 °C (99.2 °F) (10/08/18 1337)   Pulse   96 (10/08/18 1337)   Resp   20 (10/08/18 1337)     SpO2   100 % (10/08/18 1337)     Post Anesthesia Care and Evaluation    Patient location during evaluation: bedside  Patient participation: complete - patient participated  Level of consciousness: awake and alert  Pain score: 0  Pain management: adequate  Airway patency: patent  Anesthetic complications: No anesthetic complications  PONV Status: none  Cardiovascular status: acceptable  Respiratory status: acceptable  Hydration status: acceptable  Post Neuraxial Block status: Motor and sensory function returned to baseline

## 2018-10-10 LAB
CYTO UR: NORMAL
LAB AP CASE REPORT: NORMAL
PATH REPORT.FINAL DX SPEC: NORMAL
PATH REPORT.GROSS SPEC: NORMAL

## 2018-10-17 ENCOUNTER — TELEPHONE (OUTPATIENT)
Dept: GASTROENTEROLOGY | Facility: CLINIC | Age: 43
End: 2018-10-17

## 2018-10-17 DIAGNOSIS — R63.4 WEIGHT LOSS: ICD-10-CM

## 2018-10-17 DIAGNOSIS — R10.13 EPIGASTRIC PAIN: Primary | ICD-10-CM

## 2018-10-17 NOTE — TELEPHONE ENCOUNTER
Small bowel bx normal; inflammation seen on gastric bx but would not expect weight loss with this degree of inflammation - rec copnt meds as prescribed and proceed with CT scan to r/o other processes that could be causing her issues

## 2018-10-19 NOTE — TELEPHONE ENCOUNTER
Called pt and advised per Dr Howe that her sm bowel bx was normal, inflammation seen on stomach bx but would not expect weight los with this degree of inflammation.  She recommends to continue meds as prescribed and proceed with ct scan to rule out other process that could be causing her issues. Pt verb understanding.

## 2018-10-30 ENCOUNTER — HOSPITAL ENCOUNTER (OUTPATIENT)
Dept: CT IMAGING | Facility: HOSPITAL | Age: 43
Discharge: HOME | End: 2018-10-30
Attending: INTERNAL MEDICINE | Admitting: INTERNAL MEDICINE

## 2018-10-30 DIAGNOSIS — R63.4 WEIGHT LOSS: ICD-10-CM

## 2018-10-30 DIAGNOSIS — R10.13 EPIGASTRIC PAIN: ICD-10-CM

## 2018-10-30 PROCEDURE — 74177 CT ABD & PELVIS W/CONTRAST: CPT

## 2018-10-30 PROCEDURE — 25010000002 IOPAMIDOL 61 % SOLUTION: Performed by: INTERNAL MEDICINE

## 2018-10-30 PROCEDURE — 0 DIATRIZOATE MEGLUMINE & SODIUM PER 1 ML: Performed by: INTERNAL MEDICINE

## 2018-10-30 RX ADMIN — IOPAMIDOL 85 ML: 612 INJECTION, SOLUTION INTRAVENOUS at 11:20

## 2018-10-30 RX ADMIN — DIATRIZOATE MEGLUMINE AND DIATRIZOATE SODIUM 30 ML: 660; 100 LIQUID ORAL; RECTAL at 10:30

## 2018-11-02 ENCOUNTER — TELEPHONE (OUTPATIENT)
Dept: GASTROENTEROLOGY | Facility: CLINIC | Age: 43
End: 2018-11-02

## 2018-11-02 NOTE — TELEPHONE ENCOUNTER
Call to pt.  Advise per DR Howe that benign appearing cysts seen in liver and kidney - ct otherwise normal.  These findings are not concerning and do not need f/u.  Schedule f/u in 4-6 weeks to check up on symptoms.    Pt verb understanding and states will call back to make f/u appt.

## 2018-11-02 NOTE — TELEPHONE ENCOUNTER
Benign appearing cycts seen in liver and kidney - ct otherwise normal.  These findings are not concerning and do not need f/u- pls make sure she has scheduled f/u in 4-6 weeks to checkup on symptoms

## 2018-12-18 ENCOUNTER — OFFICE VISIT (OUTPATIENT)
Dept: INTERNAL MEDICINE | Facility: CLINIC | Age: 43
End: 2018-12-18

## 2018-12-18 VITALS
HEART RATE: 72 BPM | SYSTOLIC BLOOD PRESSURE: 106 MMHG | WEIGHT: 157 LBS | RESPIRATION RATE: 18 BRPM | HEIGHT: 70 IN | OXYGEN SATURATION: 99 % | BODY MASS INDEX: 22.48 KG/M2 | DIASTOLIC BLOOD PRESSURE: 72 MMHG

## 2018-12-18 DIAGNOSIS — R11.0 NAUSEA: Primary | ICD-10-CM

## 2018-12-18 DIAGNOSIS — Z86.69 HISTORY OF UVEITIS: ICD-10-CM

## 2018-12-18 DIAGNOSIS — Z87.39: ICD-10-CM

## 2018-12-18 DIAGNOSIS — R63.4 WEIGHT LOSS, UNINTENTIONAL: ICD-10-CM

## 2018-12-18 PROCEDURE — 71046 X-RAY EXAM CHEST 2 VIEWS: CPT | Performed by: INTERNAL MEDICINE

## 2018-12-18 PROCEDURE — 99214 OFFICE O/P EST MOD 30 MIN: CPT | Performed by: INTERNAL MEDICINE

## 2018-12-18 RX ORDER — PROPRANOLOL HCL 60 MG
60 CAPSULE, EXTENDED RELEASE 24HR ORAL DAILY
COMMUNITY
Start: 2018-10-09

## 2018-12-18 RX ORDER — METRONIDAZOLE 7.5 MG/G
GEL VAGINAL
Refills: 0 | COMMUNITY
Start: 2018-10-22 | End: 2018-12-18

## 2018-12-18 NOTE — PROGRESS NOTES
Chief Complaint  Chhaya Wyatt is a 43 y.o. female who presents for GI Problem and Follow-up  .    History of Present Illness   Chhaya is here for follow up on her GI issues.  She has a follow up with Dr. Howe on Thursday.  She had an endoscopy and Ct.  She has lost 20 lbs.  In the morning she feels ok.  By 11 or 11:30 she feels full and has no appetite.  If she eats something she feels like she is going to throw up.  At lunch she gets a little food to eat.  By dinner her symptoms are worse. She forces herself to eat.  She is burping all the time at night.  She feels bloated.  She has not diarrhea or constipation.   She has no abd pain.  She feels uncomfortable and nauseous.  If she takes a nap in the afternoon, she wakes up and feels better.  She drinks no alcohol or carbonated drinks.  No spicey foods.  No skin rashes.    In March, she got uveitis in one eye and had to be followed by eye doctor for 12 weeks.  She was treated by [facility].    In May she thought she had broken her leg due to pain in her left leg.  She saw Dr. Martino and was told she had periostitis in it.         Review of Systems   Constitution: Positive for malaise/fatigue and weight loss. Negative for chills and fever.   Respiratory: Negative for cough and shortness of breath.    Skin: Negative for rash.   Musculoskeletal: Negative for arthritis and joint pain.   Gastrointestinal: Positive for bloating and nausea. Negative for abdominal pain, change in bowel habit, constipation, diarrhea and vomiting.   Neurological: Negative for dizziness and light-headedness.       Patient Active Problem List   Diagnosis   • Anxiety   • Migraine   • Migraine without aura and responsive to treatment   • Panic attack   • Vitamin D deficiency   • Prediabetes   • Urine frequency   • Urine frequency   • Stress fracture of right fibula   • Stress fracture of right tibia   • Pain of right lower leg   • Periostitis of lower leg (CMS/HCC)   • Dyspepsia   •  Nausea       Past Medical History:   Diagnosis Date   • Anxiety    • Depression    • Migraine    • PONV (postoperative nausea and vomiting)        Past Surgical History:   Procedure Laterality Date   • CHOLECYSTECTOMY     • ENDOMETRIAL ABLATION     • RHINOPLASTY     • TONSILLECTOMY         Family History   Problem Relation Age of Onset   • Basal cell carcinoma Mother    • Basal cell carcinoma Father    • Breast cancer Maternal Grandmother    • Colon cancer Maternal Grandmother    • Breast cancer Paternal Grandmother        Social History     Socioeconomic History   • Marital status:      Spouse name: Not on file   • Number of children: Not on file   • Years of education: Not on file   • Highest education level: Not on file   Social Needs   • Financial resource strain: Not on file   • Food insecurity - worry: Not on file   • Food insecurity - inability: Not on file   • Transportation needs - medical: Not on file   • Transportation needs - non-medical: Not on file   Occupational History   • Occupation: director at [employer]   Tobacco Use   • Smoking status: Never Smoker   • Smokeless tobacco: Never Used   Substance and Sexual Activity   • Alcohol use: No   • Drug use: No   • Sexual activity: Yes     Partners: Male   Other Topics Concern   • Not on file   Social History Narrative   • Not on file       Current Outpatient Medications on File Prior to Visit   Medication Sig Dispense Refill   • ALPRAZolam (XANAX) 0.25 MG tablet TAKE ONE TABLET BY MOUTH EVERY 8 HOURS AS NEEDED 6 tablet 2   • propranolol LA (INDERAL LA) 60 MG 24 hr capsule Take 60 mg by mouth Daily.     • rizatriptan MLT (MAXALT-MLT) 10 MG disintegrating tablet TAKE 1 TABLET BY MOUTH ONCE AS NEEDED (HEADACHE) , MAY REPEAT ONCE IN 2HRS-MAX 30MG/24 HRS  3   • sucralfate (CARAFATE) 1 g tablet Take 1 tablet by mouth 4 (Four) Times a Day As Needed (nausea, sour stomach). 90 tablet 1   • [DISCONTINUED] acetaZOLAMIDE (DIAMOX) 250 MG tablet Take 250 mg by  "mouth Daily As Needed.  0   • [DISCONTINUED] metroNIDAZOLE (METROGEL) 0.75 % vaginal gel PLACE VAGINALLY 2 (TWO) TIMES DAILY FOR 7 DAYS  0   • [DISCONTINUED] topiramate (TOPAMAX) 50 MG tablet Take 50 mg by mouth every night at bedtime.  3     No current facility-administered medications on file prior to visit.        Allergies   Allergen Reactions   • Bacitracin Rash       Vitals:    12/18/18 0918   BP: 106/72   Pulse: 72   Resp: 18   SpO2: 99%   Weight: 71.2 kg (157 lb)   Height: 177.8 cm (70\")       Body mass index is 22.53 kg/m².    Objective   Physical Exam   Constitutional: She is oriented to person, place, and time. She appears well-developed and well-nourished. No distress.   HENT:   Head: Normocephalic and atraumatic.   Eyes: Conjunctivae are normal. No scleral icterus.   Neck: Normal range of motion. Neck supple.   Cardiovascular: Normal rate, regular rhythm and normal heart sounds. Exam reveals no gallop and no friction rub.   No murmur heard.  Pulmonary/Chest: Effort normal and breath sounds normal. No respiratory distress. She has no wheezes. She has no rales.   Abdominal: Soft. Bowel sounds are normal. She exhibits no distension. There is no tenderness. There is no guarding.   Musculoskeletal: She exhibits no edema.   Lymphadenopathy:     She has no cervical adenopathy.   Neurological: She is alert and oriented to person, place, and time. No cranial nerve deficit.   Psychiatric: She has a normal mood and affect. Her behavior is normal. Judgment and thought content normal.       Results for orders placed or performed during the hospital encounter of 10/08/18   POC Pregnancy, Urine   Result Value Ref Range    HCG, Urine, QL Negative Negative    Lot Number [id number]     Internal Positive Control Positive     Internal Negative Control Negative    Tissue Pathology Exam   Result Value Ref Range    Case Report       Surgical Pathology Report                         Case: [id number]                             " "     Authorizing Provider:  Ritu Howe MD      Collected:           10/08/2018 02:24 PM          Ordering Location:     [facility]  Received:            10/08/2018 03:25 PM                                 ENDO SUITES                                                                  Pathologist:           Waldo Mccarty MD                                                      Specimens:   1) - Small Intestine, Duodenum, DUODENALBIOPSIES                                                    2) - Stomach, RANDOM GASTRIC BIOPSIES                                                      Final Diagnosis       1. \"DUODENAL BIOPSIES\":   BENIGN SMALL INTESTINAL MUCOSAL BIOPSIES -    NO SIGNIFICANT HISTOLOGIC ABNORMALITY.    2. \"RANDOM GASTRIC BIOPSIES\":   BENIGN GASTRIC MUCOSAL BIOPSIES WITH CHANGES CONSISTENT WITH REACTIVE GASTROPATHY.   NO INTESTINAL METAPLASIA.   DIFF-QUIK STAIN: NO HELICOBACTER SEEN.    THM/pkm    CPT CODES:  1. 03955  2. 36998, 08455        Gross Description       1.  Received in formalin designated \"duodenal biopsies\" are fragments of tan tissue 0.6 x 0.5 x 0.3 cm in aggregate.  Submitted labeled 1A.     2.  In the second container in formalin designated \"random gastric biopsies\" are fragments of tan tissue 0.4 x 0.2 x 0.2 cm in aggregate. Submitted labeled 2A.  JUSTICE/brb        Microscopic Description       Performed, not dictated.         Assessment/Plan   Diagnoses and all orders for this visit:    Nausea  -     Comprehensive Metabolic Panel  -     TSH  -     CBC & Differential  -     FINA  -     Sedimentation rate, automated  -     C-reactive protein  -     XR Chest PA & Lateral    Weight loss, unintentional  -     Comprehensive Metabolic Panel  -     TSH  -     CBC & Differential  -     FINA  -     Sedimentation rate, automated  -     C-reactive protein  -     XR Chest PA & Lateral    History of uveitis  -     Comprehensive Metabolic Panel  -     TSH  -     CBC & Differential  -    "  FINA  -     Sedimentation rate, automated  -     C-reactive protein  -     XR Chest PA & Lateral    History of periostitis  -     Comprehensive Metabolic Panel  -     TSH  -     CBC & Differential  -     FINA  -     Sedimentation rate, automated  -     C-reactive protein    Other orders  -     propranolol LA (INDERAL LA) 60 MG 24 hr capsule; Take 60 mg by mouth Daily.  -     Discontinue: metroNIDAZOLE (METROGEL) 0.75 % vaginal gel; PLACE VAGINALLY 2 (TWO) TIMES DAILY FOR 7 DAYS        Discussion/Summary  Chhaya is here for follow up.  Will get labs and a CXR today.  I am concerned about her weight loss, unexplained nausea and recent uveitis.   Will see how her labs come back.    No Follow-up on file.

## 2018-12-19 LAB
ALBUMIN SERPL-MCNC: 4.7 G/DL (ref 3.5–5.2)
ALBUMIN/GLOB SERPL: 1.9 G/DL
ALP SERPL-CCNC: 59 U/L (ref 39–117)
ALT SERPL-CCNC: 10 U/L (ref 1–33)
ANA SER QL: NEGATIVE
AST SERPL-CCNC: 14 U/L (ref 1–32)
BASOPHILS # BLD AUTO: 0.01 10*3/MM3 (ref 0–0.2)
BASOPHILS NFR BLD AUTO: 0.2 % (ref 0–1.5)
BILIRUB SERPL-MCNC: 0.5 MG/DL (ref 0.1–1.2)
BUN SERPL-MCNC: 10 MG/DL (ref 6–20)
BUN/CREAT SERPL: 10.8 (ref 7–25)
CALCIUM SERPL-MCNC: 9.3 MG/DL (ref 8.6–10.5)
CHLORIDE SERPL-SCNC: 103 MMOL/L (ref 98–107)
CO2 SERPL-SCNC: 24.6 MMOL/L (ref 22–29)
CREAT SERPL-MCNC: 0.93 MG/DL (ref 0.57–1)
CRP SERPL-MCNC: 0.06 MG/DL (ref 0–0.5)
EOSINOPHIL # BLD AUTO: 0.14 10*3/MM3 (ref 0–0.7)
EOSINOPHIL NFR BLD AUTO: 2.6 % (ref 0.3–6.2)
ERYTHROCYTE [DISTWIDTH] IN BLOOD BY AUTOMATED COUNT: 12.9 % (ref 11.7–13)
ERYTHROCYTE [SEDIMENTATION RATE] IN BLOOD BY WESTERGREN METHOD: 8 MM/HR (ref 0–20)
GLOBULIN SER CALC-MCNC: 2.5 GM/DL
GLUCOSE SERPL-MCNC: 85 MG/DL (ref 65–99)
HCT VFR BLD AUTO: 39.6 % (ref 35.6–45.5)
HGB BLD-MCNC: 13 G/DL (ref 11.9–15.5)
IMM GRANULOCYTES # BLD: 0.01 10*3/MM3 (ref 0–0.03)
IMM GRANULOCYTES NFR BLD: 0.2 % (ref 0–0.5)
LYMPHOCYTES # BLD AUTO: 1.62 10*3/MM3 (ref 0.9–4.8)
LYMPHOCYTES NFR BLD AUTO: 30.2 % (ref 19.6–45.3)
MCH RBC QN AUTO: 26.7 PG (ref 26.9–32)
MCHC RBC AUTO-ENTMCNC: 32.8 G/DL (ref 32.4–36.3)
MCV RBC AUTO: 81.3 FL (ref 80.5–98.2)
MONOCYTES # BLD AUTO: 0.52 10*3/MM3 (ref 0.2–1.2)
MONOCYTES NFR BLD AUTO: 9.7 % (ref 5–12)
NEUTROPHILS # BLD AUTO: 3.08 10*3/MM3 (ref 1.9–8.1)
NEUTROPHILS NFR BLD AUTO: 57.3 % (ref 42.7–76)
PLATELET # BLD AUTO: 239 10*3/MM3 (ref 140–500)
POTASSIUM SERPL-SCNC: 4.3 MMOL/L (ref 3.5–5.2)
PROT SERPL-MCNC: 7.2 G/DL (ref 6–8.5)
RBC # BLD AUTO: 4.87 10*6/MM3 (ref 3.9–5.2)
SODIUM SERPL-SCNC: 141 MMOL/L (ref 136–145)
TSH SERPL DL<=0.005 MIU/L-ACNC: 1.05 MIU/ML (ref 0.27–4.2)
WBC # BLD AUTO: 5.37 10*3/MM3 (ref 4.5–10.7)

## 2018-12-20 ENCOUNTER — OFFICE VISIT (OUTPATIENT)
Dept: GASTROENTEROLOGY | Facility: CLINIC | Age: 43
End: 2018-12-20

## 2018-12-20 VITALS
DIASTOLIC BLOOD PRESSURE: 66 MMHG | SYSTOLIC BLOOD PRESSURE: 118 MMHG | BODY MASS INDEX: 22.45 KG/M2 | WEIGHT: 156.8 LBS | HEIGHT: 70 IN | TEMPERATURE: 98.8 F

## 2018-12-20 DIAGNOSIS — R11.0 NAUSEA: Primary | ICD-10-CM

## 2018-12-20 DIAGNOSIS — R63.4 WEIGHT LOSS: ICD-10-CM

## 2018-12-20 PROCEDURE — 99214 OFFICE O/P EST MOD 30 MIN: CPT | Performed by: INTERNAL MEDICINE

## 2018-12-20 RX ORDER — AMITRIPTYLINE HYDROCHLORIDE 10 MG/1
10 TABLET, FILM COATED ORAL NIGHTLY
Qty: 30 TABLET | Refills: 3 | Status: SHIPPED | OUTPATIENT
Start: 2018-12-20 | End: 2019-02-21 | Stop reason: SDUPTHER

## 2018-12-20 NOTE — PROGRESS NOTES
Chief Complaint   Patient presents with   • Follow-up     symptoms still the same from last visit    • Nausea       Chhaya Wyatt is a  43 y.o. female here for a follow up visit for chronic nausea. This happens between 4:30-6 about 5 out of 7 days per week. This has been going on since the beginning of the year.  She tried stopping all of her medications and this did not help. She resumed her topamax last week.      She never throws up.  She does not have a good appetite.  She has lost about 10 lbs.  No change in BM -no blood in stool.  She decribes it as a sour stomach - right in the midabdomen.      She tried 2 weeks of omeprazole and 2 weeks of zantac w/o improvement.    Egd was normal.  Negative CT abdomen.  Symptoms are starting to interfere with her home life.  HPI  Past Medical History:   Diagnosis Date   • Anxiety    • Depression    • Migraine    • PONV (postoperative nausea and vomiting)      Past Surgical History:   Procedure Laterality Date   • CHOLECYSTECTOMY     • ENDOMETRIAL ABLATION     • ENDOSCOPY N/A 10/8/2018    Gastritis   • RHINOPLASTY     • TONSILLECTOMY         Current Outpatient Medications:   •  ALPRAZolam (XANAX) 0.25 MG tablet, TAKE ONE TABLET BY MOUTH EVERY 8 HOURS AS NEEDED, Disp: 6 tablet, Rfl: 2  •  propranolol LA (INDERAL LA) 60 MG 24 hr capsule, Take 60 mg by mouth Daily., Disp: , Rfl:   •  rizatriptan MLT (MAXALT-MLT) 10 MG disintegrating tablet, TAKE 1 TABLET BY MOUTH ONCE AS NEEDED (HEADACHE) , MAY REPEAT ONCE IN 2HRS-MAX 30MG/24 HRS, Disp: , Rfl: 3  •  sucralfate (CARAFATE) 1 g tablet, Take 1 tablet by mouth 4 (Four) Times a Day As Needed (nausea, sour stomach)., Disp: 90 tablet, Rfl: 1  •  amitriptyline (ELAVIL) 10 MG tablet, Take 1 tablet by mouth Every Night., Disp: 30 tablet, Rfl: 3  PRN Meds:.  Allergies   Allergen Reactions   • Bacitracin Rash     Social History     Socioeconomic History   • Marital status:      Spouse name: Not on file   • Number of children: Not on  file   • Years of education: Not on file   • Highest education level: Not on file   Social Needs   • Financial resource strain: Not on file   • Food insecurity - worry: Not on file   • Food insecurity - inability: Not on file   • Transportation needs - medical: Not on file   • Transportation needs - non-medical: Not on file   Occupational History   • Occupation: director at [employer]   Tobacco Use   • Smoking status: Never Smoker   • Smokeless tobacco: Never Used   Substance and Sexual Activity   • Alcohol use: No   • Drug use: No   • Sexual activity: Yes     Partners: Male   Other Topics Concern   • Not on file   Social History Narrative   • Not on file     Family History   Problem Relation Age of Onset   • Basal cell carcinoma Mother    • Basal cell carcinoma Father    • Breast cancer Maternal Grandmother    • Colon cancer Maternal Grandmother    • Breast cancer Paternal Grandmother      Review of Systems   Constitutional: Positive for appetite change and unexpected weight change.   Gastrointestinal: Positive for nausea.   All other systems reviewed and are negative.    Vitals:    12/20/18 1333   BP: 118/66   Temp: 98.8 °F (37.1 °C)         12/20/18  1333   Weight: 71.1 kg (156 lb 12.8 oz)     Physical Exam   Constitutional: She appears well-developed and well-nourished.   HENT:   Head: Normocephalic and atraumatic.   Eyes: No scleral icterus.   Pulmonary/Chest: Effort normal.   Abdominal: She exhibits no distension.   Neurological: She is alert.   Skin: Skin is warm and dry.   Psychiatric: She has a normal mood and affect.     No images are attached to the encounter.  Diagnoses and all orders for this visit:    Nausea  -     NM Gastric Emptying; Future    Weight loss    Other orders  -     amitriptyline (ELAVIL) 10 MG tablet; Take 1 tablet by mouth Every Night.    Plan:  - symptoms persistent - w/u negative to date  - trial amitryptiline qhs  - check ges for further eval

## 2018-12-31 ENCOUNTER — HOSPITAL ENCOUNTER (OUTPATIENT)
Dept: NUCLEAR MEDICINE | Facility: HOSPITAL | Age: 43
Discharge: HOME | End: 2018-12-31
Attending: INTERNAL MEDICINE

## 2018-12-31 DIAGNOSIS — R11.0 NAUSEA: ICD-10-CM

## 2018-12-31 PROCEDURE — 78264 GASTRIC EMPTYING IMG STUDY: CPT

## 2018-12-31 PROCEDURE — A9541 TC99M SULFUR COLLOID: HCPCS | Performed by: INTERNAL MEDICINE

## 2018-12-31 PROCEDURE — 0 TECHNETIUM SULFUR COLLOID: Performed by: INTERNAL MEDICINE

## 2018-12-31 RX ADMIN — TECHNETIUM TC 99M SULFUR COLLOID 1 DOSE: KIT at 07:29

## 2019-01-02 ENCOUNTER — TELEPHONE (OUTPATIENT)
Dept: GASTROENTEROLOGY | Facility: CLINIC | Age: 44
End: 2019-01-02

## 2019-01-02 NOTE — TELEPHONE ENCOUNTER
Gastric emptying test was normal-please remind her to call me with an update when she has been on amitriptyline for 3-4 weeks

## 2019-01-04 NOTE — TELEPHONE ENCOUNTER
Call to pt.  Advise per Dr Howe that GES was normal.  Reminder to call DR Howe with update when has been on amitriptyline for 3-4 weeks.  Pt verb understanding.

## 2019-01-24 ENCOUNTER — TELEPHONE (OUTPATIENT)
Dept: GASTROENTEROLOGY | Facility: CLINIC | Age: 44
End: 2019-01-24

## 2019-01-24 NOTE — TELEPHONE ENCOUNTER
Regarding: Visit Follow-Up Question  Contact: [phone]  ----- Message from [employer], Generic sent at 1/23/2019  6:48 PM EST -----    Dr Howe requested I give an update after being on the Amitriptyline for 30 days.  After the first week, I started feeling better and had less nausea and dyspepsia and a better appetite for the better part of three weeks.  I even gained some weight back. However, for the last three days, things have started going back to the way they were30 days ago.  I am still eating ok at lunch, but by 5 o clock I have no appetite and feel exhausted with an upset stomach.     I have refilled the prescription and will continue it for now unless I hear otherwise from your office.      Thank you,   Chhaya Wyatt

## 2019-01-24 NOTE — TELEPHONE ENCOUNTER
Continue current dose - if symptoms persist, I would recommend increasing the dose - pls check back in 1-2 weeks if not improving

## 2019-01-25 NOTE — TELEPHONE ENCOUNTER
Called pt and advised per Dr Howe to continue current dose - if symptoms persist, she would recommend increasing the dose.  Advised pt to check back with us with an update in 1-2 weeks if not improving .  Pt verb understanding.

## 2019-02-21 ENCOUNTER — TELEPHONE (OUTPATIENT)
Dept: GASTROENTEROLOGY | Facility: CLINIC | Age: 44
End: 2019-02-21

## 2019-02-21 RX ORDER — AMITRIPTYLINE HYDROCHLORIDE 10 MG/1
10 TABLET, FILM COATED ORAL NIGHTLY
Qty: 90 TABLET | Refills: 3 | Status: SHIPPED | OUTPATIENT
Start: 2019-02-21 | End: 2022-02-02

## 2019-02-21 NOTE — TELEPHONE ENCOUNTER
Faxed request received from [facility] for amitriptyline 10 mg.  See note of 1/24/19.    Rx last prescribed 12/20/18 - 1 tab po QHS, #30, R3 to [facility] in [place].    Call to pt.  Confirms that needs to change to [facility] for insurance purposes.    Dr Howe out of office - message to OSVALDO Hickman.  Mail order pharmacy - need 90 day rx.

## 2020-01-29 RX ORDER — AMITRIPTYLINE HYDROCHLORIDE 10 MG/1
TABLET, FILM COATED ORAL
Qty: 30 TABLET | Refills: 3 | OUTPATIENT
Start: 2020-01-29

## 2020-08-31 ENCOUNTER — HOSPITAL ENCOUNTER (OUTPATIENT)
Dept: CT IMAGING | Facility: HOSPITAL | Age: 45
Discharge: HOME | End: 2020-08-31
Admitting: INTERNAL MEDICINE

## 2020-08-31 ENCOUNTER — TRANSCRIBE ORDERS (OUTPATIENT)
Dept: ADMINISTRATIVE | Facility: HOSPITAL | Age: 45
End: 2020-08-31

## 2020-08-31 DIAGNOSIS — R07.81 PLEURITIC PAIN: ICD-10-CM

## 2020-08-31 DIAGNOSIS — R07.9 CHEST PAIN, UNSPECIFIED TYPE: ICD-10-CM

## 2020-08-31 DIAGNOSIS — I26.99 PULMONARY EMBOLISM, UNSPECIFIED CHRONICITY, UNSPECIFIED PULMONARY EMBOLISM TYPE, UNSPECIFIED WHETHER ACUTE COR PULMONALE PRESENT (HCC): ICD-10-CM

## 2020-08-31 DIAGNOSIS — R07.9 CHEST PAIN, UNSPECIFIED TYPE: Primary | ICD-10-CM

## 2020-08-31 PROCEDURE — 0 IOPAMIDOL PER 1 ML: Performed by: INTERNAL MEDICINE

## 2020-08-31 PROCEDURE — 71275 CT ANGIOGRAPHY CHEST: CPT

## 2020-08-31 RX ADMIN — IOPAMIDOL 95 ML: 755 INJECTION, SOLUTION INTRAVENOUS at 13:08

## 2020-09-11 ENCOUNTER — TRANSCRIBE ORDERS (OUTPATIENT)
Dept: ADMINISTRATIVE | Facility: HOSPITAL | Age: 45
End: 2020-09-11

## 2020-09-11 DIAGNOSIS — Z12.31 VISIT FOR SCREENING MAMMOGRAM: Primary | ICD-10-CM

## 2020-10-13 ENCOUNTER — HOSPITAL ENCOUNTER (OUTPATIENT)
Dept: MAMMOGRAPHY | Facility: HOSPITAL | Age: 45
Discharge: HOME | End: 2020-10-13
Admitting: INTERNAL MEDICINE

## 2020-10-13 DIAGNOSIS — Z12.31 VISIT FOR SCREENING MAMMOGRAM: ICD-10-CM

## 2020-10-13 PROCEDURE — 77067 SCR MAMMO BI INCL CAD: CPT

## 2020-10-13 PROCEDURE — 77063 BREAST TOMOSYNTHESIS BI: CPT

## 2021-11-19 ENCOUNTER — TRANSCRIBE ORDERS (OUTPATIENT)
Dept: ADMINISTRATIVE | Facility: HOSPITAL | Age: 46
End: 2021-11-19

## 2021-11-19 DIAGNOSIS — Z12.31 SCREENING MAMMOGRAM, ENCOUNTER FOR: Primary | ICD-10-CM

## 2021-12-18 ENCOUNTER — APPOINTMENT (OUTPATIENT)
Dept: MAMMOGRAPHY | Facility: HOSPITAL | Age: 46
End: 2021-12-18

## 2022-01-19 ENCOUNTER — PRE-PROCEDURE SCREENING (OUTPATIENT)
Dept: GASTROENTEROLOGY | Facility: CLINIC | Age: 47
End: 2022-01-19

## 2022-01-19 ENCOUNTER — PREP FOR SURGERY (OUTPATIENT)
Dept: OTHER | Facility: HOSPITAL | Age: 47
End: 2022-01-19

## 2022-01-19 DIAGNOSIS — Z80.0 FH: COLON CANCER: Primary | ICD-10-CM

## 2022-01-19 NOTE — TELEPHONE ENCOUNTER
Last scope 20yrs ( no records)-- No personal history--Family history of polyps ( mother)-- Family history of colon cancer--(grandmother)--Medications:                    ALPRAZolam (XANAX) 0.25 MG tablet  amitriptyline (ELAVIL) 10 MG tablet  propranolol LA (INDERAL LA) 60 MG 24 hr capsule  rizatriptan MLT (MAXALT-MLT) 10 MG disintegrating tablet  sucralfate (CARAFATE) 1 g tablet          Pt verbalized and understood that it would be few weeks before been schedule

## 2022-01-29 ENCOUNTER — APPOINTMENT (OUTPATIENT)
Dept: MAMMOGRAPHY | Facility: HOSPITAL | Age: 47
End: 2022-01-29

## 2022-01-31 ENCOUNTER — HOSPITAL ENCOUNTER (OUTPATIENT)
Dept: MAMMOGRAPHY | Facility: HOSPITAL | Age: 47
Discharge: HOME | End: 2022-01-31
Admitting: INTERNAL MEDICINE

## 2022-01-31 DIAGNOSIS — Z12.31 SCREENING MAMMOGRAM, ENCOUNTER FOR: ICD-10-CM

## 2022-01-31 PROCEDURE — 77067 SCR MAMMO BI INCL CAD: CPT

## 2022-01-31 PROCEDURE — 77063 BREAST TOMOSYNTHESIS BI: CPT

## 2022-02-02 ENCOUNTER — OFFICE VISIT (OUTPATIENT)
Dept: GASTROENTEROLOGY | Facility: CLINIC | Age: 47
End: 2022-02-02

## 2022-02-02 VITALS — WEIGHT: 176 LBS | HEIGHT: 69 IN | BODY MASS INDEX: 26.07 KG/M2 | TEMPERATURE: 97.4 F

## 2022-02-02 DIAGNOSIS — Z12.11 ENCOUNTER FOR SCREENING COLONOSCOPY: ICD-10-CM

## 2022-02-02 DIAGNOSIS — K59.04 CHRONIC IDIOPATHIC CONSTIPATION: Primary | ICD-10-CM

## 2022-02-02 PROCEDURE — 99203 OFFICE O/P NEW LOW 30 MIN: CPT | Performed by: INTERNAL MEDICINE

## 2022-02-02 RX ORDER — ESCITALOPRAM OXALATE 20 MG/1
20 TABLET ORAL DAILY
COMMUNITY
Start: 2021-06-01

## 2022-02-02 RX ORDER — OMEPRAZOLE 40 MG/1
40 CAPSULE, DELAYED RELEASE ORAL DAILY
COMMUNITY

## 2022-02-02 RX ORDER — BUSPIRONE HYDROCHLORIDE 7.5 MG/1
7.5 TABLET ORAL DAILY
COMMUNITY
Start: 2019-11-01

## 2022-02-02 RX ORDER — ONDANSETRON 4 MG/1
4 TABLET, FILM COATED ORAL EVERY 8 HOURS PRN
Qty: 4 TABLET | Refills: 0 | Status: SHIPPED | OUTPATIENT
Start: 2022-02-02

## 2022-02-02 NOTE — PROGRESS NOTES
Subjective   Chief Complaint   Patient presents with   • Heartburn   • Nausea   • Abdominal Pain   • Diarrhea       Chhaya Wyatt is a  47 y.o. female here for a follow up visit for heartburn nausea abdominal pain and diarrhea.  She was last seen in 2018.    Symptoms began in November -she reports epigastric pain - squeezing, twisting pain.  Associated with nausea.  Went away with omeprazole 40 mg bid for a month.    She reports symptoms have returned but are different.  She reports that she is gassy.  She is having cramping pain.  She reports that she is chronically constipated and she has a bowel movement every 3 to 4 days.  She has had 2 episodes of diarrhea in the past month which is really uncommon for her.  She reports that this was kind of a complete emptying of her bowel.  She has not seen any blood in her stool.  She has been belching more.    Previous colonoscopy in 2000-no polyps to her knowledge.  She does report that she had nausea and vomiting with the prep.  HPI  Past Medical History:   Diagnosis Date   • Anxiety    • Cholelithiasis 2009    Gallbladder removed   • Depression    • Irritable bowel syndrome 1999    Diagnosed due to bouts of stomach pain with no known cause   • Migraine    • PONV (postoperative nausea and vomiting)      Past Surgical History:   Procedure Laterality Date   • BREAST BIOPSY     • CHOLECYSTECTOMY     • COLONOSCOPY  2000   • ENDOMETRIAL ABLATION     • ENDOSCOPY N/A 10/8/2018    Gastritis   • RHINOPLASTY     • TONSILLECTOMY     • UPPER GASTROINTESTINAL ENDOSCOPY  2019?       Current Outpatient Medications:   •  ALPRAZolam (XANAX) 0.25 MG tablet, TAKE ONE TABLET BY MOUTH EVERY 8 HOURS AS NEEDED, Disp: 6 tablet, Rfl: 2  •  busPIRone (BUSPAR) 7.5 MG tablet, Take 7.5 mg by mouth Daily., Disp: , Rfl:   •  escitalopram (LEXAPRO) 20 MG tablet, Take 20 mg by mouth Daily., Disp: , Rfl:   •  omeprazole (priLOSEC) 40 MG capsule, Take 40 mg by mouth Daily., Disp: , Rfl:   •  propranolol LA  (INDERAL LA) 60 MG 24 hr capsule, Take 60 mg by mouth Daily., Disp: , Rfl:   •  ubrogepant (UBRELVY) 100 MG tablet, Take 100 mg by mouth Daily., Disp: , Rfl:   •  ondansetron (Zofran) 4 MG tablet, Take 1 tablet by mouth Every 8 (Eight) Hours As Needed for Nausea or Vomiting (for colonoscopy prep)., Disp: 4 tablet, Rfl: 0  PRN Meds:.  Allergies   Allergen Reactions   • Bacitracin Rash     Social History     Socioeconomic History   • Marital status:    Tobacco Use   • Smoking status: Never Smoker   • Smokeless tobacco: Never Used   Substance and Sexual Activity   • Alcohol use: No   • Drug use: No   • Sexual activity: Yes     Partners: Male     Birth control/protection: Surgical     Family History   Problem Relation Age of Onset   • Basal cell carcinoma Mother    • Colon polyps Mother    • Basal cell carcinoma Father    • Breast cancer Maternal Grandmother    • Colon cancer Maternal Grandmother    • Breast cancer Paternal Grandmother      Review of Systems   Constitutional: Negative for appetite change and unexpected weight change.   Gastrointestinal: Positive for abdominal pain and constipation. Negative for blood in stool.     Vitals:    02/02/22 1406   Temp: 97.4 °F (36.3 °C)         02/02/22  1406   Weight: 79.8 kg (176 lb)       Objective   Physical Exam  Constitutional:       Appearance: Normal appearance. She is well-developed.   HENT:      Head: Normocephalic and atraumatic.   Eyes:      General: No scleral icterus.     Conjunctiva/sclera: Conjunctivae normal.   Pulmonary:      Effort: Pulmonary effort is normal.   Abdominal:      General: There is no distension.      Palpations: Abdomen is soft.   Musculoskeletal:      Cervical back: Normal range of motion and neck supple.   Skin:     General: Skin is warm and dry.   Neurological:      Mental Status: She is alert.   Psychiatric:         Mood and Affect: Mood normal.         Behavior: Behavior normal.       Mammo Screening Digital Tomosynthesis  Bilateral With CAD    Result Date: 2/1/2022  1. There is no evidence for malignancy or significant change in either breast. Routine followup mammography is recommended.  BI-RADS category 1: Negative.  This report was finalized on 2/1/2022 7:09 AM by Dr. Cortez Gomez M.D.        Assessment/Plan   Diagnoses and all orders for this visit:    Chronic idiopathic constipation    Encounter for screening colonoscopy    Other orders  -     busPIRone (BUSPAR) 7.5 MG tablet; Take 7.5 mg by mouth Daily.  -     escitalopram (LEXAPRO) 20 MG tablet; Take 20 mg by mouth Daily.  -     ubrogepant (UBRELVY) 100 MG tablet; Take 100 mg by mouth Daily.  -     omeprazole (priLOSEC) 40 MG capsule; Take 40 mg by mouth Daily.  -     ondansetron (Zofran) 4 MG tablet; Take 1 tablet by mouth Every 8 (Eight) Hours As Needed for Nausea or Vomiting (for colonoscopy prep).      Plan:  · Start fiber supplement daily-I suspect that a lot of her more recent symptoms are related to worsening of her mild constipation.  We discussed that if fiber does not confer benefit over the next month, the next step would be to start MiraLAX.  · She is due for colonoscopy for age-related screening.  We discussed means to reduce prep related nausea and vomiting including starting MiraLAX a week before the prep and using preprepped Zofran which have sent to her pharmacy.  · Pepcid as needed for any heartburn or indigestion but this is essentially resolved.  Should her upper abdominal symptoms recur, would recommend EGD to be performed with her colonoscopy.  She will let me know if this happens.

## 2022-02-03 ENCOUNTER — PATIENT ROUNDING (BHMG ONLY) (OUTPATIENT)
Dept: GASTROENTEROLOGY | Facility: CLINIC | Age: 47
End: 2022-02-03

## 2022-02-03 NOTE — PROGRESS NOTES
February 3, 2022    Hello, may I speak with Chhaya Wyatt?    My name is Ana Lilia    I am  with [facility]  [address] [place] [id number] [phone].    Before we get started may I verify your date of birth? 1975    I am calling to officially welcome you to our practice and ask about your recent visit. Is this a good time to talk? yes    Tell me about your visit with us. What things went well?  The staff at check in and check out were friendly and efficient.  The visit with Dr Howe went fine.        We're always looking for ways to make our patients' experiences even better. Do you have recommendations on ways we may improve?  More parking     Overall were you satisfied with your first visit to our practice? Yes       I appreciate you taking the time to speak with me today. Is there anything else I can do for you? No       Thank you, and have a great day.

## 2022-03-22 ENCOUNTER — TELEPHONE (OUTPATIENT)
Dept: GASTROENTEROLOGY | Facility: CLINIC | Age: 47
End: 2022-03-22

## 2022-04-14 ENCOUNTER — OUTSIDE FACILITY SERVICE (OUTPATIENT)
Dept: GASTROENTEROLOGY | Facility: CLINIC | Age: 47
End: 2022-04-14

## 2022-04-14 PROCEDURE — 45378 DIAGNOSTIC COLONOSCOPY: CPT | Performed by: INTERNAL MEDICINE

## 2022-04-14 RX ORDER — HYDROCORTISONE ACETATE 25 MG/1
25 SUPPOSITORY RECTAL NIGHTLY PRN
Qty: 30 SUPPOSITORY | Refills: 1 | Status: SHIPPED | OUTPATIENT
Start: 2022-04-14

## 2022-04-26 ENCOUNTER — TELEPHONE (OUTPATIENT)
Dept: GASTROENTEROLOGY | Facility: CLINIC | Age: 47
End: 2022-04-26

## 2022-04-26 NOTE — TELEPHONE ENCOUNTER
----- Message from Ritu Howe MD sent at 4/25/2022  4:31 PM EDT -----  Regarding: recall  Please put the patient in the recall system for colonoscopy in 5 years    ----- Message -----  From: Kehinde Main Incoming  Sent: 4/20/2022   3:12 PM EDT  To: Ritu Howe MD

## 2023-03-14 ENCOUNTER — TRANSCRIBE ORDERS (OUTPATIENT)
Dept: ADMINISTRATIVE | Facility: HOSPITAL | Age: 48
End: 2023-03-14
Payer: COMMERCIAL

## 2023-03-14 DIAGNOSIS — Z12.31 SCREENING MAMMOGRAM FOR BREAST CANCER: Primary | ICD-10-CM

## 2023-03-21 ENCOUNTER — HOSPITAL ENCOUNTER (OUTPATIENT)
Dept: MAMMOGRAPHY | Facility: HOSPITAL | Age: 48
Discharge: HOME | End: 2023-03-21
Admitting: INTERNAL MEDICINE
Payer: COMMERCIAL

## 2023-03-21 DIAGNOSIS — Z12.31 SCREENING MAMMOGRAM FOR BREAST CANCER: ICD-10-CM

## 2023-03-21 PROCEDURE — 77063 BREAST TOMOSYNTHESIS BI: CPT

## 2023-03-21 PROCEDURE — 77067 SCR MAMMO BI INCL CAD: CPT

## 2024-03-04 ENCOUNTER — TRANSCRIBE ORDERS (OUTPATIENT)
Dept: ADMINISTRATIVE | Facility: HOSPITAL | Age: 49
End: 2024-03-04
Payer: COMMERCIAL

## 2024-03-04 DIAGNOSIS — Z12.31 ENCOUNTER FOR SCREENING MAMMOGRAM FOR MALIGNANT NEOPLASM OF BREAST: Primary | ICD-10-CM

## 2024-03-27 ENCOUNTER — HOSPITAL ENCOUNTER (OUTPATIENT)
Dept: MAMMOGRAPHY | Facility: HOSPITAL | Age: 49
Discharge: HOME | End: 2024-03-27
Admitting: INTERNAL MEDICINE
Payer: COMMERCIAL

## 2024-03-27 DIAGNOSIS — Z12.31 ENCOUNTER FOR SCREENING MAMMOGRAM FOR MALIGNANT NEOPLASM OF BREAST: ICD-10-CM

## 2024-03-27 PROCEDURE — 77063 BREAST TOMOSYNTHESIS BI: CPT

## 2024-03-27 PROCEDURE — 77067 SCR MAMMO BI INCL CAD: CPT

## 2025-04-11 ENCOUNTER — TRANSCRIBE ORDERS (OUTPATIENT)
Dept: ADMINISTRATIVE | Facility: HOSPITAL | Age: 50
End: 2025-04-11
Payer: COMMERCIAL

## 2025-04-11 DIAGNOSIS — Z12.31 SCREENING MAMMOGRAM, ENCOUNTER FOR: Primary | ICD-10-CM

## 2025-04-22 ENCOUNTER — HOSPITAL ENCOUNTER (OUTPATIENT)
Dept: MAMMOGRAPHY | Facility: HOSPITAL | Age: 50
Discharge: HOME | End: 2025-04-22
Admitting: INTERNAL MEDICINE
Payer: COMMERCIAL

## 2025-04-22 DIAGNOSIS — Z12.31 SCREENING MAMMOGRAM, ENCOUNTER FOR: ICD-10-CM

## 2025-04-22 PROCEDURE — 77063 BREAST TOMOSYNTHESIS BI: CPT

## 2025-04-22 PROCEDURE — 77067 SCR MAMMO BI INCL CAD: CPT

## (undated) DEVICE — CANNULA,ADULT,SOFT-TOUCH,7'TUBE,UC: Brand: PENDING

## (undated) DEVICE — Device: Brand: DEFENDO AIR/WATER/SUCTION AND BIOPSY VALVE

## (undated) DEVICE — FRCP BIOP RADLJAW4 HOT 2.2X240 BX40

## (undated) DEVICE — TUBING, SUCTION, 1/4" X 10', STRAIGHT: Brand: MEDLINE

## (undated) DEVICE — BITEBLOCK OMNI BLOC